# Patient Record
Sex: FEMALE | Race: WHITE | NOT HISPANIC OR LATINO | Employment: FULL TIME | URBAN - METROPOLITAN AREA
[De-identification: names, ages, dates, MRNs, and addresses within clinical notes are randomized per-mention and may not be internally consistent; named-entity substitution may affect disease eponyms.]

---

## 2018-05-31 ENCOUNTER — OFFICE VISIT (OUTPATIENT)
Dept: URGENT CARE | Facility: CLINIC | Age: 28
End: 2018-05-31
Payer: COMMERCIAL

## 2018-05-31 VITALS
HEIGHT: 64 IN | SYSTOLIC BLOOD PRESSURE: 103 MMHG | BODY MASS INDEX: 19.46 KG/M2 | HEART RATE: 75 BPM | DIASTOLIC BLOOD PRESSURE: 64 MMHG | OXYGEN SATURATION: 99 % | TEMPERATURE: 98 F | WEIGHT: 114 LBS | RESPIRATION RATE: 16 BRPM

## 2018-05-31 DIAGNOSIS — G43.001 MIGRAINE WITHOUT AURA AND WITH STATUS MIGRAINOSUS, NOT INTRACTABLE: Primary | ICD-10-CM

## 2018-05-31 PROCEDURE — 96372 THER/PROPH/DIAG INJ SC/IM: CPT | Mod: S$GLB,,, | Performed by: NURSE PRACTITIONER

## 2018-05-31 PROCEDURE — 99213 OFFICE O/P EST LOW 20 MIN: CPT | Mod: 25,S$GLB,, | Performed by: NURSE PRACTITIONER

## 2018-05-31 RX ORDER — PROCHLORPERAZINE MALEATE 10 MG
10 TABLET ORAL 3 TIMES DAILY
COMMUNITY
End: 2019-04-04

## 2018-05-31 RX ORDER — ELETRIPTAN HYDROBROMIDE 40 MG/1
40 TABLET, FILM COATED ORAL
COMMUNITY
End: 2019-04-04

## 2018-05-31 RX ORDER — ZONISAMIDE 50 MG/1
CAPSULE ORAL
COMMUNITY
End: 2019-04-04

## 2018-05-31 RX ORDER — DESVENLAFAXINE 100 MG/1
100 TABLET, EXTENDED RELEASE ORAL DAILY
COMMUNITY
End: 2019-05-28

## 2018-05-31 RX ORDER — TIZANIDINE 4 MG/1
4 TABLET ORAL EVERY 6 HOURS PRN
COMMUNITY
End: 2020-03-18 | Stop reason: SDUPTHER

## 2018-05-31 RX ORDER — DICLOFENAC POTASSIUM 50 MG/1
50 TABLET, FILM COATED ORAL 2 TIMES DAILY
COMMUNITY
End: 2020-02-17

## 2018-05-31 RX ORDER — ACETAMINOPHEN AND CODEINE PHOSPHATE 120; 12 MG/5ML; MG/5ML
1 SOLUTION ORAL DAILY
COMMUNITY
End: 2019-05-28

## 2018-05-31 RX ORDER — PROPRANOLOL HYDROCHLORIDE 10 MG/1
10 TABLET ORAL 3 TIMES DAILY
COMMUNITY
End: 2019-03-11 | Stop reason: SDUPTHER

## 2018-05-31 RX ORDER — BUTALBITAL, ACETAMINOPHEN AND CAFFEINE 50; 325; 40 MG/1; MG/1; MG/1
1 TABLET ORAL EVERY 4 HOURS PRN
COMMUNITY
End: 2020-02-17

## 2018-05-31 RX ORDER — DOXEPIN HYDROCHLORIDE 10 MG/ML
SOLUTION ORAL NIGHTLY
COMMUNITY
End: 2019-03-11 | Stop reason: SDUPTHER

## 2018-05-31 RX ORDER — KETOROLAC TROMETHAMINE 30 MG/ML
30 INJECTION, SOLUTION INTRAMUSCULAR; INTRAVENOUS
Status: COMPLETED | OUTPATIENT
Start: 2018-05-31 | End: 2018-05-31

## 2018-05-31 RX ADMIN — KETOROLAC TROMETHAMINE 30 MG: 30 INJECTION, SOLUTION INTRAMUSCULAR; INTRAVENOUS at 08:05

## 2018-06-01 NOTE — PATIENT INSTRUCTIONS
"  Migraine Headache: Stages and Treatment    A migraine headache tends to progress in stages. Learning these stages can help you better understand what is happening. Then you can learn ways to reduce pain and relieve other symptoms. Methods for relieving your symptoms include self-care and medicines.  Migraine stages  Migraines tend to progress through 4 stages. Many people don't have all stages, and stages may differ with each headache:  · Prodrome. A few hours to a day or so before the headache, you may feel tired, (yawning many times), uneasy, or contreras. You may also feel bloated or crave certain foods.  · Aura. Up to an hour before the headache starts, some migraine sufferers experience aura--flashing lights, blind spots, other vision problems, confusion, difficulty speaking, or other neurologic symptoms.  · Headache. Moderate to severe pain affects one side of the head and then can spread to both sides, often along with nausea. You may be highly sensitive to light, sound, and odors. Vomiting or diarrhea may also happen. This stage lasts 4 to 72 hours.  · Postdrome. After your headache ends, you may feel tired, achy, and "washed out." This may last for a day or so.  Self-care during a migraine  Here is what you can do:  · Use a cold compress. Wrap a thin cloth around a cold pack, a cold can of soda, or a bag of frozen vegetables. Apply this to your temple or other pain site.  · Drink fluids. If nausea makes it hard to drink, try sucking on ice.  · Rest. If possible, lie down. Try not to bend over, as this may increase your pain. Sometimes laying in a dark quiet room can help the migraine from being aggravated.    · Try caffeine. Some people find that drinking fluids with caffeine, such as coffee or tea, helps to lessen migraine pain.  Using medicines  Work with your healthcare provider to find the right medicines for you. Medicines for migraine may relieve pain (analgesics), relieve nausea, or attack the " migraine's root causes (migraine-specific medicines).  Rebound headache  Taking analgesics each day, or even several times a week, may lead to more frequent and severe headaches. These are called rebound headaches. If you think you're having rebound headaches, tell your healthcare provider. He or she can help you safely decrease your medicine. Rebound caffeine withdrawal headaches can also happen.    Date Last Reviewed: 10/9/2015  © 5068-6133 [employer] [address] [place] [address], [place] [address]. All rights reserved. This information is not intended as a substitute for professional medical care. Always follow your healthcare professional's instructions.

## 2018-06-01 NOTE — PROGRESS NOTES
"Subjective:       Patient ID: Nazario Gill is a 28 y.o. female.    Vitals:  height is 5' 4" (1.626 m) and weight is 51.7 kg (114 lb). Her oral temperature is 98.3 °F (36.8 °C). Her blood pressure is 103/64 and her pulse is 75. Her respiration is 16 and oxygen saturation is 99%.     Chief Complaint: Migraine    Migraine    This is a chronic problem. Episode onset: 3 days ago. The problem occurs constantly. The problem has been unchanged. The pain is located in the temporal region. The pain quality is similar to prior headaches. The quality of the pain is described as squeezing. The pain is at a severity of 10/10. The pain is severe. Associated symptoms include nausea and photophobia. Pertinent negatives include no blurred vision, dizziness, fever, neck pain, numbness, seizures, tinnitus, vomiting or weakness. The symptoms are aggravated by bright light. She has tried oral narcotics and darkened room (fioricet, relpax, compazine,diclofenac, zanaflex.  Patient was also treated in ER last night. Symptoms improved but returned today) for the symptoms. Her past medical history is significant for migraine headaches.     Review of Systems   Constitution: Negative for chills, fever and weakness.   HENT: Negative for congestion and tinnitus.    Eyes: Positive for photophobia. Negative for blurred vision.   Skin: Negative for rash.   Musculoskeletal: Negative for neck pain.   Gastrointestinal: Positive for nausea. Negative for vomiting.   Neurological: Negative for disturbances in coordination, dizziness, headaches, numbness and seizures.   Psychiatric/Behavioral: Negative for altered mental status. The patient is not nervous/anxious.        Objective:      Physical Exam   Constitutional: She is oriented to person, place, and time. She appears well-developed and well-nourished. She is cooperative.  Non-toxic appearance. She does not appear ill. No distress.   HENT:   Head: Normocephalic and atraumatic.   Right Ear: Hearing, " tympanic membrane, external ear and ear canal normal.   Left Ear: Hearing, tympanic membrane, external ear and ear canal normal.   Nose: Nose normal. No mucosal edema, rhinorrhea or nasal deformity. No epistaxis. Right sinus exhibits no maxillary sinus tenderness and no frontal sinus tenderness. Left sinus exhibits no maxillary sinus tenderness and no frontal sinus tenderness.   Mouth/Throat: Uvula is midline, oropharynx is clear and moist and mucous membranes are normal. No trismus in the jaw. Normal dentition. No uvula swelling. No posterior oropharyngeal erythema.   No temporal TTP   Eyes: Conjunctivae, EOM and lids are normal. Pupils are equal, round, and reactive to light. No scleral icterus.   Sclera clear bilat   Neck: Trachea normal, normal range of motion, full passive range of motion without pain and phonation normal. Neck supple. No neck rigidity.   Cardiovascular: Normal rate, regular rhythm, normal heart sounds, intact distal pulses and normal pulses.    Pulmonary/Chest: Effort normal and breath sounds normal. No respiratory distress.   Abdominal: Soft. Normal appearance and bowel sounds are normal. She exhibits no distension. There is no tenderness.   Musculoskeletal: Normal range of motion. She exhibits no edema or deformity.   Neurological: She is alert and oriented to person, place, and time. No cranial nerve deficit. She exhibits normal muscle tone. Coordination normal.   Skin: Skin is warm, dry and intact. She is not diaphoretic. No pallor.   Psychiatric: She has a normal mood and affect. Her speech is normal and behavior is normal. Judgment and thought content normal. Cognition and memory are normal.   Nursing note and vitals reviewed.      Assessment:       1. Migraine without aura and with status migrainosus, not intractable        Plan:         Migraine without aura and with status migrainosus, not intractable  -     ketorolac injection 30 mg; Inject 1 mL (30 mg total) into the muscle one  time.

## 2018-06-03 ENCOUNTER — TELEPHONE (OUTPATIENT)
Dept: URGENT CARE | Facility: CLINIC | Age: 28
End: 2018-06-03

## 2019-03-11 ENCOUNTER — OFFICE VISIT (OUTPATIENT)
Dept: SURGERY | Facility: CLINIC | Age: 29
End: 2019-03-11
Payer: COMMERCIAL

## 2019-03-11 VITALS
WEIGHT: 121.5 LBS | DIASTOLIC BLOOD PRESSURE: 78 MMHG | HEART RATE: 74 BPM | SYSTOLIC BLOOD PRESSURE: 119 MMHG | HEIGHT: 62 IN | BODY MASS INDEX: 22.36 KG/M2

## 2019-03-11 DIAGNOSIS — K64.4 EXTERNAL HEMORRHOIDS: Primary | ICD-10-CM

## 2019-03-11 DIAGNOSIS — K64.8 HEMORRHOIDS, INTERNAL, WITH BLEEDING: ICD-10-CM

## 2019-03-11 PROCEDURE — 99999 PR PBB SHADOW E&M-EST. PATIENT-LVL III: CPT | Mod: PBBFAC,,, | Performed by: COLON & RECTAL SURGERY

## 2019-03-11 PROCEDURE — 3008F BODY MASS INDEX DOCD: CPT | Mod: CPTII,S$GLB,, | Performed by: COLON & RECTAL SURGERY

## 2019-03-11 PROCEDURE — 99203 PR OFFICE/OUTPT VISIT, NEW, LEVL III, 30-44 MIN: ICD-10-PCS | Mod: 25,S$GLB,, | Performed by: COLON & RECTAL SURGERY

## 2019-03-11 PROCEDURE — 99203 OFFICE O/P NEW LOW 30 MIN: CPT | Mod: 25,S$GLB,, | Performed by: COLON & RECTAL SURGERY

## 2019-03-11 PROCEDURE — 3008F PR BODY MASS INDEX (BMI) DOCUMENTED: ICD-10-PCS | Mod: CPTII,S$GLB,, | Performed by: COLON & RECTAL SURGERY

## 2019-03-11 PROCEDURE — 46600 PR DIAG2STIC A2SCOPY: ICD-10-PCS | Mod: S$GLB,,, | Performed by: COLON & RECTAL SURGERY

## 2019-03-11 PROCEDURE — 46600 DIAGNOSTIC ANOSCOPY SPX: CPT | Mod: S$GLB,,, | Performed by: COLON & RECTAL SURGERY

## 2019-03-11 PROCEDURE — 99999 PR PBB SHADOW E&M-EST. PATIENT-LVL III: ICD-10-PCS | Mod: PBBFAC,,, | Performed by: COLON & RECTAL SURGERY

## 2019-03-11 RX ORDER — DOXEPIN HYDROCHLORIDE 10 MG/1
CAPSULE ORAL
Refills: 2 | COMMUNITY
Start: 2019-02-02 | End: 2019-09-17 | Stop reason: SDUPTHER

## 2019-03-11 RX ORDER — ELETRIPTAN HYDROBROMIDE 40 MG/1
TABLET, FILM COATED ORAL
COMMUNITY
End: 2019-03-11 | Stop reason: SDUPTHER

## 2019-03-11 RX ORDER — BUSPIRONE HYDROCHLORIDE 5 MG/1
5 TABLET ORAL 2 TIMES DAILY
Refills: 2 | COMMUNITY
Start: 2019-02-26 | End: 2019-04-04

## 2019-03-11 RX ORDER — DOXEPIN HYDROCHLORIDE 10 MG/1
CAPSULE ORAL
COMMUNITY
End: 2019-03-11 | Stop reason: SDUPTHER

## 2019-03-11 RX ORDER — AMOXICILLIN AND CLAVULANATE POTASSIUM 875; 125 MG/1; MG/1
TABLET, FILM COATED ORAL
COMMUNITY
End: 2019-04-04

## 2019-03-11 RX ORDER — BUTALBITAL, ACETAMINOPHEN AND CAFFEINE 300; 40; 50 MG/1; MG/1; MG/1
CAPSULE ORAL
COMMUNITY
End: 2019-04-04 | Stop reason: SDUPTHER

## 2019-03-11 RX ORDER — PROPRANOLOL HYDROCHLORIDE 10 MG/1
TABLET ORAL
COMMUNITY
End: 2019-05-28 | Stop reason: SDUPTHER

## 2019-03-11 RX ORDER — BUPROPION HYDROCHLORIDE 75 MG/1
TABLET ORAL
Refills: 1 | COMMUNITY
Start: 2019-02-14 | End: 2019-07-24 | Stop reason: SDUPTHER

## 2019-03-11 RX ORDER — TIZANIDINE HYDROCHLORIDE 4 MG/1
CAPSULE, GELATIN COATED ORAL
COMMUNITY
End: 2019-03-11 | Stop reason: SDUPTHER

## 2019-03-11 RX ORDER — DICLOFENAC POTASSIUM 50 MG/1
TABLET, FILM COATED ORAL
COMMUNITY
End: 2019-03-11 | Stop reason: SDUPTHER

## 2019-03-11 RX ORDER — ACETAMINOPHEN AND CODEINE PHOSPHATE 120; 12 MG/5ML; MG/5ML
SOLUTION ORAL
COMMUNITY
End: 2019-05-28

## 2019-03-11 RX ORDER — AZITHROMYCIN 500 MG/1
TABLET, FILM COATED ORAL
COMMUNITY
End: 2019-05-28 | Stop reason: ALTCHOICE

## 2019-03-11 RX ORDER — PROCHLORPERAZINE MALEATE 10 MG
TABLET ORAL
COMMUNITY
End: 2019-03-11 | Stop reason: SDUPTHER

## 2019-03-11 NOTE — LETTER
March 15, 2019      Giovani Ramos MD  [address]  [address]  [place] [address]           [facility]  [address]  [facility] [address]  Phone: [phone]          Patient: Nazario Gill   MR Number: [id number]   YOB: 1990   Date of Visit: 3/11/2019       Dear Dr. Giovani Ramos:    Thank you for referring Nazario Gill to me for evaluation. Attached you will find relevant portions of my assessment and plan of care.    If you have questions, please do not hesitate to call me. I look forward to following Nazario Gill along with you.    Sincerely,    Margarita Renae  CC:  No Recipients    If you would like to receive this communication electronically, please contact [email or web address] or [phone] to request more information on [employer] access.    For providers and/or their staff who would like to refer a patient to [facility], please contact us through our one-stop-shop provider referral line, [facility], at [phone].    If you feel you have received this communication in error or would no longer like to receive these types of communications, please e-mail [email or web address]

## 2019-03-18 ENCOUNTER — ANESTHESIA EVENT (OUTPATIENT)
Dept: SURGERY | Facility: HOSPITAL | Age: 29
End: 2019-03-18
Payer: COMMERCIAL

## 2019-03-19 NOTE — H&P (VIEW-ONLY)
"Subjective:       Patient ID: Nazario Gill is a 28 y.o. female.    Chief Complaint: Hemorrhoids    HPI   27 yo F with complaints of "hemorrhoids." She c/o itching, irritation, occasional pain and bleeding.  She reports that she has an external hemorrhoid that periodically gets larger and more symptomatic. No constipation/straining.  No abdominal pain, unexplained weight loss, anorexia, recent change in bowel habits, or other constitutional symptoms.     Last colonoscopy - never  No family hx of CRC or IBD.      Review of patient's allergies indicates:   Allergen Reactions    Cefzil [cefprozil] Hives    Sulfamethoxazole-trimethoprim        Past Medical History:   Diagnosis Date    Migraine        Past Surgical History:   Procedure Laterality Date    CYSTOTOMY         Current Outpatient Medications   Medication Sig Dispense Refill    amoxicillin-clavulanate 875-125mg (AUGMENTIN) 875-125 mg per tablet amoxicillin 875 mg-potassium clavulanate 125 mg tablet      azithromycin (ZITHROMAX) 500 MG tablet azithromycin 500 mg tablet      buPROPion (WELLBUTRIN) 75 MG tablet TAKE 1 TABLET BY MOUTH EVERY DAY FOR 2 WEEKS THEN TWICE A DAY THERE AFTER  1    busPIRone (BUSPAR) 5 MG Tab Take 5 mg by mouth 2 (two) times daily.  2    butalbital-acetaminophen-caff -40 mg Cap butalbital-acetaminophen-caffeine 50 mg-300 mg-40 mg capsule      butalbital-acetaminophen-caffeine -40 mg (FIORICET, ESGIC) -40 mg per tablet Take 1 tablet by mouth every 4 (four) hours as needed for Pain.      desvenlafaxine succinate (PRISTIQ) 100 MG Tb24 Take 100 mg by mouth once daily.      diclofenac (CATAFLAM) 50 MG tablet Take 50 mg by mouth 2 (two) times daily.      doxepin (SINEQUAN) 10 MG capsule TAKE 1 TO 3 CAPS AT BEDTIME AS NEEDED FOR SLEEP  2    norethindrone (YA) 0.35 mg tablet Ya 0.35 mg tablet      norethindrone (MICRONOR) 0.35 mg tablet Take 1 tablet by mouth once daily.      prochlorperazine (COMPAZINE) " 10 MG tablet Take 10 mg by mouth 3 (three) times daily.      propranolol (INDERAL) 10 MG tablet propranolol 10 mg tablet      tiZANidine (ZANAFLEX) 4 MG tablet Take 4 mg by mouth every 6 (six) hours as needed.      zonisamide (ZONEGRAN) 50 MG Cap Take by mouth.      eletriptan (RELPAX) 40 MG tablet Take 40 mg by mouth as needed. may repeat in 2 hours if necessary       No current facility-administered medications for this visit.        History reviewed. No pertinent family history.    Social History     Socioeconomic History    Marital status: Single     Spouse name: None    Number of children: None    Years of education: None    Highest education level: None   Social Needs    Financial resource strain: None    Food insecurity - worry: None    Food insecurity - inability: None    Transportation needs - medical: None    Transportation needs - non-medical: None   Occupational History    None   Tobacco Use    Smoking status: Never Smoker    Smokeless tobacco: Never Used   Substance and Sexual Activity    Alcohol use: None    Drug use: None    Sexual activity: None   Other Topics Concern    None   Social History Narrative    None       Review of Systems   Constitutional: Negative for chills and fever.   HENT: Negative for congestion and sore throat.    Eyes: Negative for visual disturbance.   Respiratory: Negative for cough and shortness of breath.    Cardiovascular: Negative for chest pain and palpitations.   Gastrointestinal: Positive for anal bleeding and rectal pain. Negative for abdominal distention, abdominal pain, blood in stool, constipation, diarrhea, nausea and vomiting.   Endocrine: Negative for cold intolerance and heat intolerance.   Genitourinary: Negative for dysuria and frequency.   Musculoskeletal: Negative for arthralgias, back pain and neck pain.   Skin: Negative for rash.   Allergic/Immunologic: Negative for immunocompromised state.   Neurological: Negative for dizziness,  light-headedness and headaches.   Hematological: Does not bruise/bleed easily.   Psychiatric/Behavioral: Negative for confusion. The patient is not nervous/anxious.        Objective:      Physical Exam   Constitutional: She is oriented to person, place, and time. She appears well-developed and well-nourished.   HENT:   Head: Normocephalic.   Pulmonary/Chest: Effort normal. No respiratory distress.   Abdominal: Soft. Bowel sounds are normal. She exhibits no distension and no mass. There is no tenderness. There is no rebound and no guarding.   Genitourinary:   Genitourinary Comments: Perineum - normal perianal skin, no mass, no fissure, + large external hemorrhoid RAL  MARISEL - good tone, no mass  Anoscopy - Grade 2 internal hemorrhoid RAL associated with external hemorrhoid     Musculoskeletal: Normal range of motion.   Neurological: She is alert and oriented to person, place, and time.   Skin: Skin is warm and dry.   Psychiatric: She has a normal mood and affect.         Lab Results   Component Value Date    WBC 10.23 03/11/2019    HGB 14.7 03/11/2019    HCT 44.4 03/11/2019    MCV 94 03/11/2019     03/11/2019     BMP  No results found for: NA, K, CL, CO2, BUN, CREATININE, CALCIUM, ANIONGAP, ESTGFRAFRICA, EGFRNONAA  CMP  No results found for: NA, K, CL, CO2, GLU, BUN, CREATININE, CALCIUM, PROT, ALBUMIN, BILITOT, ALKPHOS, AST, ALT, ANIONGAP, ESTGFRAFRICA, EGFRNONAA  No results found for: CEA        Assessment:       1. External hemorrhoids    2. Hemorrhoids, internal, with bleeding        Plan:   Discussed management options.  She would prefer to have the external hemorrhoid removed, so she will be scheduled for excisional hemorrhoidectomy - likely RAL single-column.   We discussed the expected degree & duration of post-op discomfort, and she still wishes to proceed with surgery.  Scheduled for 3/21/19 @ [facility].    I have discussed the procedure at length with Nazario Gill.  We discussed the rationale,  risks, benefits, and alternatives in depth.  We discussed the expected outcomes and potential complications including but not limited to bleeding, infection, recurrence, prolonged pain, need for further procedures and altered continence.  She verbalized her understanding of the procedure and wishes to proceed.  Written consent was obtained.    Silver Crouch MD, FACS, FASCRS  Senior Staff Surgeon  Department of Colon & Rectal Surgery

## 2019-03-20 ENCOUNTER — TELEPHONE (OUTPATIENT)
Dept: SURGERY | Facility: CLINIC | Age: 29
End: 2019-03-20

## 2019-03-20 NOTE — TELEPHONE ENCOUNTER
----- Message from Jes Bojorquez sent at 3/20/2019 11:42 AM CDT -----  Contact: pt#[phone]  Needs Advice    Reason for call:pt is calling for prep for surgery on 3/21        Communication Preference:call    Additional Information:

## 2019-03-21 ENCOUNTER — HOSPITAL ENCOUNTER (OUTPATIENT)
Facility: HOSPITAL | Age: 29
Discharge: HOME | End: 2019-03-21
Attending: COLON & RECTAL SURGERY | Admitting: COLON & RECTAL SURGERY
Payer: COMMERCIAL

## 2019-03-21 ENCOUNTER — ANESTHESIA (OUTPATIENT)
Dept: SURGERY | Facility: HOSPITAL | Age: 29
End: 2019-03-21
Payer: COMMERCIAL

## 2019-03-21 VITALS
HEART RATE: 82 BPM | TEMPERATURE: 97 F | DIASTOLIC BLOOD PRESSURE: 79 MMHG | BODY MASS INDEX: 22.36 KG/M2 | SYSTOLIC BLOOD PRESSURE: 141 MMHG | RESPIRATION RATE: 16 BRPM | WEIGHT: 121.5 LBS | OXYGEN SATURATION: 100 % | HEIGHT: 62 IN

## 2019-03-21 DIAGNOSIS — K64.9 HEMORRHOIDS: ICD-10-CM

## 2019-03-21 LAB — B-HCG UR QL: NEGATIVE

## 2019-03-21 PROCEDURE — 46255 PR HEMORRHOIDECTOMY,INT/EXT,1 COLUMN/GROUP: ICD-10-PCS | Mod: ,,, | Performed by: COLON & RECTAL SURGERY

## 2019-03-21 PROCEDURE — 88304 TISSUE SPECIMEN TO PATHOLOGY - SURGERY: ICD-10-PCS | Mod: 26,,, | Performed by: PATHOLOGY

## 2019-03-21 PROCEDURE — 37000009 HC ANESTHESIA EA ADD 15 MINS: Performed by: COLON & RECTAL SURGERY

## 2019-03-21 PROCEDURE — 71000033 HC RECOVERY, INTIAL HOUR: Performed by: COLON & RECTAL SURGERY

## 2019-03-21 PROCEDURE — 88304 TISSUE EXAM BY PATHOLOGIST: CPT | Mod: 26,,, | Performed by: PATHOLOGY

## 2019-03-21 PROCEDURE — 63600175 PHARM REV CODE 636 W HCPCS: Performed by: NURSE ANESTHETIST, CERTIFIED REGISTERED

## 2019-03-21 PROCEDURE — 88304 TISSUE EXAM BY PATHOLOGIST: CPT | Performed by: PATHOLOGY

## 2019-03-21 PROCEDURE — 25000003 PHARM REV CODE 250: Performed by: NURSE PRACTITIONER

## 2019-03-21 PROCEDURE — 36000707: Performed by: COLON & RECTAL SURGERY

## 2019-03-21 PROCEDURE — 36000706: Performed by: COLON & RECTAL SURGERY

## 2019-03-21 PROCEDURE — 46255 REMOVE INT/EXT HEM 1 GROUP: CPT | Mod: ,,, | Performed by: COLON & RECTAL SURGERY

## 2019-03-21 PROCEDURE — 00902 ANES ANORECTAL PX: CPT | Mod: QZ,P1 | Performed by: NURSE ANESTHETIST, CERTIFIED REGISTERED

## 2019-03-21 PROCEDURE — 37000008 HC ANESTHESIA 1ST 15 MINUTES: Performed by: COLON & RECTAL SURGERY

## 2019-03-21 PROCEDURE — 25000003 PHARM REV CODE 250: Performed by: NURSE ANESTHETIST, CERTIFIED REGISTERED

## 2019-03-21 PROCEDURE — 25000003 PHARM REV CODE 250: Performed by: COLON & RECTAL SURGERY

## 2019-03-21 PROCEDURE — 81025 URINE PREGNANCY TEST: CPT

## 2019-03-21 RX ORDER — LIDOCAINE HYDROCHLORIDE 10 MG/ML
INJECTION INFILTRATION; PERINEURAL
Status: DISCONTINUED | OUTPATIENT
Start: 2019-03-21 | End: 2019-03-21 | Stop reason: HOSPADM

## 2019-03-21 RX ORDER — DIPHENHYDRAMINE HCL 25 MG
25 CAPSULE ORAL EVERY 6 HOURS PRN
Status: DISCONTINUED | OUTPATIENT
Start: 2019-03-21 | End: 2019-03-21 | Stop reason: HOSPADM

## 2019-03-21 RX ORDER — SODIUM CHLORIDE, SODIUM LACTATE, POTASSIUM CHLORIDE, CALCIUM CHLORIDE 600; 310; 30; 20 MG/100ML; MG/100ML; MG/100ML; MG/100ML
INJECTION, SOLUTION INTRAVENOUS CONTINUOUS PRN
Status: DISCONTINUED | OUTPATIENT
Start: 2019-03-21 | End: 2019-03-21

## 2019-03-21 RX ORDER — MUPIROCIN 20 MG/G
OINTMENT TOPICAL
Status: ACTIVE | OUTPATIENT
Start: 2019-03-21

## 2019-03-21 RX ORDER — PROPOFOL 10 MG/ML
INJECTION, EMULSION INTRAVENOUS
Status: DISCONTINUED | OUTPATIENT
Start: 2019-03-21 | End: 2019-03-21

## 2019-03-21 RX ORDER — HYDROMORPHONE HYDROCHLORIDE 1 MG/ML
0.5 INJECTION, SOLUTION INTRAMUSCULAR; INTRAVENOUS; SUBCUTANEOUS
Status: DISCONTINUED | OUTPATIENT
Start: 2019-03-21 | End: 2019-03-21 | Stop reason: HOSPADM

## 2019-03-21 RX ORDER — MIDAZOLAM HYDROCHLORIDE 1 MG/ML
INJECTION, SOLUTION INTRAMUSCULAR; INTRAVENOUS
Status: DISCONTINUED | OUTPATIENT
Start: 2019-03-21 | End: 2019-03-21

## 2019-03-21 RX ORDER — LIDOCAINE HYDROCHLORIDE 10 MG/ML
1 INJECTION, SOLUTION EPIDURAL; INFILTRATION; INTRACAUDAL; PERINEURAL ONCE
Status: ACTIVE | OUTPATIENT
Start: 2019-03-21

## 2019-03-21 RX ORDER — FENTANYL CITRATE 50 UG/ML
INJECTION, SOLUTION INTRAMUSCULAR; INTRAVENOUS
Status: DISCONTINUED | OUTPATIENT
Start: 2019-03-21 | End: 2019-03-21

## 2019-03-21 RX ORDER — LIDOCAINE HCL/PF 100 MG/5ML
SYRINGE (ML) INTRAVENOUS
Status: DISCONTINUED | OUTPATIENT
Start: 2019-03-21 | End: 2019-03-21

## 2019-03-21 RX ORDER — SODIUM CHLORIDE 9 MG/ML
INJECTION, SOLUTION INTRAVENOUS CONTINUOUS
Status: ACTIVE | OUTPATIENT
Start: 2019-03-21

## 2019-03-21 RX ORDER — BUPIVACAINE HYDROCHLORIDE 2.5 MG/ML
INJECTION, SOLUTION EPIDURAL; INFILTRATION; INTRACAUDAL
Status: DISCONTINUED | OUTPATIENT
Start: 2019-03-21 | End: 2019-03-21 | Stop reason: HOSPADM

## 2019-03-21 RX ORDER — OXYCODONE AND ACETAMINOPHEN 5; 325 MG/1; MG/1
1 TABLET ORAL EVERY 6 HOURS PRN
Qty: 40 TABLET | Refills: 0 | Status: SHIPPED | OUTPATIENT
Start: 2019-03-21 | End: 2019-03-31

## 2019-03-21 RX ORDER — OXYCODONE AND ACETAMINOPHEN 5; 325 MG/1; MG/1
2 TABLET ORAL EVERY 4 HOURS PRN
Status: DISCONTINUED | OUTPATIENT
Start: 2019-03-21 | End: 2019-03-21 | Stop reason: HOSPADM

## 2019-03-21 RX ADMIN — MIDAZOLAM 2 MG: 1 INJECTION INTRAMUSCULAR; INTRAVENOUS at 11:03

## 2019-03-21 RX ADMIN — DIPHENHYDRAMINE HYDROCHLORIDE 25 MG: 25 CAPSULE ORAL at 02:03

## 2019-03-21 RX ADMIN — PROPOFOL 20 MG: 10 INJECTION, EMULSION INTRAVENOUS at 11:03

## 2019-03-21 RX ADMIN — PROPOFOL 10 MG: 10 INJECTION, EMULSION INTRAVENOUS at 11:03

## 2019-03-21 RX ADMIN — PROPOFOL 100 MG: 10 INJECTION, EMULSION INTRAVENOUS at 11:03

## 2019-03-21 RX ADMIN — FENTANYL CITRATE 50 MCG: 50 INJECTION, SOLUTION INTRAMUSCULAR; INTRAVENOUS at 11:03

## 2019-03-21 RX ADMIN — PROPOFOL 30 MG: 10 INJECTION, EMULSION INTRAVENOUS at 11:03

## 2019-03-21 RX ADMIN — SODIUM CHLORIDE: 0.9 INJECTION, SOLUTION INTRAVENOUS at 10:03

## 2019-03-21 RX ADMIN — OXYCODONE AND ACETAMINOPHEN 2 TABLET: 5; 325 TABLET ORAL at 01:03

## 2019-03-21 RX ADMIN — SODIUM CHLORIDE: 0.9 INJECTION, SOLUTION INTRAVENOUS at 11:03

## 2019-03-21 RX ADMIN — SODIUM CHLORIDE, SODIUM LACTATE, POTASSIUM CHLORIDE, AND CALCIUM CHLORIDE: .6; .31; .03; .02 INJECTION, SOLUTION INTRAVENOUS at 11:03

## 2019-03-21 RX ADMIN — LIDOCAINE HYDROCHLORIDE 40 MG: 20 INJECTION, SOLUTION INTRAVENOUS at 11:03

## 2019-03-21 NOTE — NURSING
Discussed discharge instructions, including medications, follow up, post-op care.  Patient verbalized understanding.  Patient escorted to vehicle via wheelchair; mother with patient.  Itching and redness have disappeared with benadryl.

## 2019-03-21 NOTE — NURSING
Patient complaining of generalized itching and redness related to oral pain medications.  Denies any shortness of breath, difficulty breathing, throat swelling.  Called Dr. Crouch to update with findings; spoke with his nurse, Elsi Muro.  Order given for Diphenhydramine, 25mg, orally to be taken every 6 hours.

## 2019-03-21 NOTE — DISCHARGE INSTRUCTIONS
Discharge Instructions for Hemorrhoid Surgery  You had surgery to remove hemorrhoids. These are large, swollen veins inside and outside the anus. Hemorrhoids are caused by too much pressure on the anus. This is often due to straining during bowel movements or pressure during pregnancy. After surgery, it may take a few weeks or longer to recover. This sheet tells you how to care for yourself once youre home.   Home care  You may have some bleeding, discharge, or itching for a short time after surgery. This is common. Once at home, be sure to:  · Take prescribed pain medicines on time as directed. Dont skip doses or wait until pain gets bad, as it may be harder to control.  · Take sitz baths. Fill a tub with 3 inches of warm water. Sit in the basin or tub for 10 to 20 minutes a few times a day and after each bowel movement.  · Avoid straining to pass stool. This can increase pressure on the anus. It can also lead to swelling.  · Avoid constipation:  ¨ Use a laxative or stool softener as advised.  ¨ Eat more high-fiber foods. These include whole grains, fruit, and veggies.  ¨ Drink plenty of fluids.  · Avoid heavy lifting and strenuous activity for 1 to 2 weeks.  · Use suppositories and pads, if needed. These can help relieve symptoms.  · Avoid driving until youre able to sit and move without pain. Ask someone to drive you to appointments, if needed.  · Practice good bowel habits. Dont ignore the urge to go. But avoid spending too much time on the toilet.  Follow-up  Youll have a follow-up visit with the healthcare provider. During this visit, the healthcare provider will check how well youre healing. This visit will likely happen within 1 to 2 weeks.  When to call your healthcare provider  Call your healthcare provider right away if you have any of the following:  · Fever of 100.4°F (38.0°C) or higher, or as directed by your healthcare provider  · A large amount of drainage or bleeding from the  rectum  · Trouble urinating  · No bowel movement for more than 48 hours   Date Last Reviewed: 7/1/2016  © 4377-5136 [employer] [address] [place] [address], [place] [address]. All rights reserved. This information is not intended as a substitute for professional medical care. Always follow your healthcare professional's instructions.

## 2019-03-21 NOTE — ANESTHESIA PREPROCEDURE EVALUATION
03/21/2019  Nazario Gill is a 28 y.o., female.    Anesthesia Evaluation    I have reviewed the Patient Summary Reports.    I have reviewed the Nursing Notes.   I have reviewed the Medications.     Review of Systems  Anesthesia Hx:  No problems with previous Anesthesia    Social:  Non-Smoker, No Alcohol Use    Hematology/Oncology:  Hematology Normal   Oncology Normal     EENT/Dental:EENT/Dental Normal   Cardiovascular:  Cardiovascular Normal Exercise tolerance: good     Pulmonary:  Pulmonary Normal    Renal/:  Renal/ Normal     Hepatic/GI:  Hepatic/GI Normal    Musculoskeletal:  Musculoskeletal Normal    Neurological:   Headaches    Endocrine:  Endocrine Normal    Dermatological:  Skin Normal    Psych:  Psychiatric Normal           Physical Exam  General:  Well nourished    Airway/Jaw/Neck:  Airway Findings: Mouth Opening: Normal Tongue: Normal  General Airway Assessment: Adult  Mallampati: II  TM Distance: Normal, at least 6 cm  Jaw/Neck Findings:  Neck ROM: Normal ROM      Dental:  Dental Findings: In tact        Mental Status:  Mental Status Findings:  Cooperative         Anesthesia Plan  Type of Anesthesia, risks & benefits discussed:  Anesthesia Type:  general  Patient's Preference:   Intra-op Monitoring Plan: standard ASA monitors  Intra-op Monitoring Plan Comments:   Post Op Pain Control Plan: multimodal analgesia  Post Op Pain Control Plan Comments:   Induction:    Beta Blocker:  Patient is not currently on a Beta-Blocker (No further documentation required).       Informed Consent: Patient understands risks and agrees with Anesthesia plan.  Questions answered. Anesthesia consent signed with patient.  ASA Score: 1     Day of Surgery Review of History & Physical: I have interviewed and examined the patient. I have reviewed the patient's H&P dated: 3/21/19. There are no significant changes.   H&P update referred to the surgeon.         Ready For Surgery From Anesthesia Perspective.

## 2019-03-21 NOTE — ANESTHESIA POSTPROCEDURE EVALUATION
Anesthesia Post Evaluation    Patient: Nazario Gill    Procedure(s) Performed: Procedure(s) (LRB):  HEMORRHOIDECTOMY (N/A)    Final Anesthesia Type: general  Patient location during evaluation: PACU  Patient participation: Yes- Able to Participate  Level of consciousness: awake and alert, oriented and awake  Post-procedure vital signs: reviewed and stable  Pain management: adequate  Airway patency: patent  PONV status at discharge: No PONV  Anesthetic complications: no      Cardiovascular status: blood pressure returned to baseline  Respiratory status: unassisted, spontaneous ventilation and room air  Hydration status: euvolemic  Follow-up not needed.  Comments: NAAC        Visit Vitals  /75   Pulse 72   Temp 36.1 °C (97 °F) (Oral)   Resp 16   SpO2 100%   Breastfeeding? No       Pain/Jennifer Score: Jennifer Score: 10 (3/21/2019 12:40 PM)

## 2019-03-21 NOTE — BRIEF OP NOTE
[facility]  Brief Operative Note    SUMMARY     Surgery Date: 3/21/2019     Surgeon(s) and Role:     * Silver Crouch MD - Primary    Assisting Surgeon: None    Pre-op Diagnosis:  External hemorrhoids [K64.4]  Hemorrhoids, internal, with bleeding [K64.8]    Operative Care:  Post-op Diagnosis: Post-Op Diagnosis Codes:     * External hemorrhoids [K64.4]     * Hemorrhoids, internal, with bleeding [K64.8]    Procedure(s) (LRB):  HEMORRHOIDECTOMY (N/A)    Anesthesia: Local/MAC   Total volume administered 1000 cc     Technical Procedures Used: Excisional hemorrhoidectomy, single column (anterior midline)    Description of the findings of the procedure: Single enlarged hemorrhoidal column-  Anterior midline with both internal/external components    Wound Class (Contaminated    Complications: No     Estimated Blood Loss: 10 mL           Specimens:   Specimen (12h ago, onward)    Start     Ordered    03/21/19 1149  Specimen to Pathology - Surgery  Once     Comments:  Pre-op:external hemorrhoids, internal hemorrhoids with bleedingPost-op:sameProcedure:hemorrhoidectomySpecimen:anterior midline hemorrhoidPhysician: Dr. Crouch     Start Status   03/21/19 1149 Collected (03/21/19 1157)       03/21/19 1156          Implants: * No implants in log *    Post-Operative Care:         Disposition: PACU - hemodynamically stable.           Condition: Good  PT Temp >36 upon leaving the OR? Yes

## 2019-03-21 NOTE — NURSING
Patient arrived to floor post-op from hemmeroidectomy in room 313.  Bedside report given by YESENIA Fountain.  Mother at bedside.  Patient awake, alert, oriented.  Assisted patient to restroom to void.  Patient tolerated ambulation well.  Pain currently 6/10.

## 2019-03-21 NOTE — DISCHARGE SUMMARY
Discharge Note      SUMMARY     Admit Date: 3/21/2019    Attending Physician: Silver Crouch MD     Discharge Physician: Silver Crouch MD    Discharge Date: 3/21/2019     Final Diagnosis: Post-Op Diagnosis Codes:     * External hemorrhoids [K64.4]     * Hemorrhoids, internal, with bleeding [K64.8]    Hospital Course: Patient was admitted for an outpatient procedure and tolerated the procedure well with no complications.    Disposition: Home or Self Care    Follow Up/Patient Instructions:     High fiber diet/daily fiber supplement  8-10 glasses of water/day  Colace 100 mg 2x/day  Miralax 1 capful in glass of water 2x/day  Avoid straining/constipation  Gauze packing will pass with 1st BM  Dry dressing as needed  Some bleeding is expected - call for excessive bleeding  Call for severe pain, fever >101, difficulty urinating, constipation  Follow-up with Dr. Crouch in 3 weeks      Current Discharge Medication List      START taking these medications    Details   oxyCODONE-acetaminophen (PERCOCET) 5-325 mg per tablet Take 1 tablet by mouth every 6 (six) hours as needed for Pain (severe pain).  Qty: 40 tablet, Refills: 0         CONTINUE these medications which have NOT CHANGED    Details   buPROPion (WELLBUTRIN) 75 MG tablet TAKE 1 TABLET BY MOUTH EVERY DAY FOR 2 WEEKS THEN TWICE A DAY THERE AFTER  Refills: 1      busPIRone (BUSPAR) 5 MG Tab Take 5 mg by mouth 2 (two) times daily.  Refills: 2      butalbital-acetaminophen-caff -40 mg Cap butalbital-acetaminophen-caffeine 50 mg-300 mg-40 mg capsule      butalbital-acetaminophen-caffeine -40 mg (FIORICET, ESGIC) -40 mg per tablet Take 1 tablet by mouth every 4 (four) hours as needed for Pain.      desvenlafaxine succinate (PRISTIQ) 100 MG Tb24 Take 100 mg by mouth once daily.      diclofenac (CATAFLAM) 50 MG tablet Take 50 mg by mouth 2 (two) times daily.      doxepin (SINEQUAN) 10 MG capsule TAKE 1 TO 3 CAPS AT BEDTIME AS NEEDED FOR SLEEP  Refills: 2       eletriptan (RELPAX) 40 MG tablet Take 40 mg by mouth as needed. may repeat in 2 hours if necessary      !! norethindrone (YA) 0.35 mg tablet Ya 0.35 mg tablet      !! norethindrone (MICRONOR) 0.35 mg tablet Take 1 tablet by mouth once daily.      prochlorperazine (COMPAZINE) 10 MG tablet Take 10 mg by mouth 3 (three) times daily.      propranolol (INDERAL) 10 MG tablet propranolol 10 mg tablet      tiZANidine (ZANAFLEX) 4 MG tablet Take 4 mg by mouth every 6 (six) hours as needed.      zonisamide (ZONEGRAN) 50 MG Cap Take by mouth.      amoxicillin-clavulanate 875-125mg (AUGMENTIN) 875-125 mg per tablet amoxicillin 875 mg-potassium clavulanate 125 mg tablet      azithromycin (ZITHROMAX) 500 MG tablet azithromycin 500 mg tablet       !! - Potential duplicate medications found. Please discuss with provider.          Discharge Procedure Orders (must include Diet, Follow-up, Activity)   Discharge Procedure Orders (must include Diet, Follow-up, Activity)   Diet Adult Regular     Activity as tolerated

## 2019-03-21 NOTE — TRANSFER OF CARE
Anesthesia Transfer of Care Note    Patient: Nazario Gill    Procedure(s) Performed: Procedure(s) (LRB):  HEMORRHOIDECTOMY (N/A)    Patient location: PACU    Anesthesia Type: MAC    Transport from OR: Transported from OR on room air with adequate spontaneous ventilation    Post pain: adequate analgesia    Post assessment: no apparent anesthetic complications and tolerated procedure well    Post vital signs: stable    Level of consciousness: sedated    Nausea/Vomiting: no nausea/vomiting    Complications: none    Transfer of care protocol was followed      Last vitals:   Visit Vitals  /63 (BP Location: Left arm, Patient Position: Lying)   Pulse 95   Resp 16   SpO2 100%   Breastfeeding? No

## 2019-03-21 NOTE — INTERVAL H&P NOTE
The patient has been examined and the H&P has been reviewed:        I concur with the findings and no changes have occurred since H&P was written.        Patient cleared for Anesthesia: MAC        Anesthesia/Surgery risks, benefits and alternative options discussed and understood by patient/family.      Active Hospital Problems    Diagnosis  POA    Hemorrhoids [K64.9]  Yes      Resolved Hospital Problems   No resolved problems to display.

## 2019-03-21 NOTE — OP NOTE
DATE OF PROCEDURE:  03/21/2019    PREOPERATIVE DIAGNOSIS:  Symptomatic internal and external hemorrhoids.    POSTOPERATIVE DIAGNOSIS:  Symptomatic internal and external hemorrhoids.    PROCEDURE PERFORMED:  Excisional hemorrhoidectomy (single column, anterior   midline, internal/external).    SURGEON:  Silver Crouch M.D.    ASSISTANT:  None.    ANESTHESIA:  Local MAC.    IV FLUIDS:  1 L crystalloid.    ESTIMATED BLOOD LOSS:  Less than 5 mL.    DRAINS:  None.    SPECIMENS:  Anterior midline hemorrhoidal complex to Pathology.    COMPLICATIONS:  None.    OPERATIVE FINDINGS:  Enlarged anterior midline external hemorrhoid with an   associated internal component.  No other significant hemorrhoidal disease   present.    INDICATIONS FOR PROCEDURE:  The patient is a 28-year-old female who presented to   the office with complaints of a painful external hemorrhoid.  On anoscopy, she   had an internal component associated with this.  We discussed treatment options   and she elected to proceed with an excisional hemorrhoidectomy.    DESCRIPTION OF PROCEDURE:  The patient was identified, brought to the Operating   Room and placed on the table in a prone position after obtaining informed   consent and after ensuring adequate padding of all pressure points.  After   initiation of monitored anesthesia care, the table was jackknifed and the   buttocks taped apart to efface the anal verge.  The perianal region was prepped   and draped in usual sterile fashion.    A perianal block was then performed using a combination of 1% lidocaine and   0.25% Marcaine.  Visual inspection revealed an external hemorrhoid in the   anterior midline position.  We evaluated the anal canal with a lighted Velazquez   anal retractor.  There was an internal component associated with the external   component in the anterior midline; however, there was no other significant   hemorrhoidal disease present.    We began by making an elliptical incision around the  external component.  This   was then dissected off the underlying sphincter musculature using a combination   of sharp dissection and electrocautery.  Mobilization was carried proximally   into the anal canal, mobilizing the hemorrhoidal complex up to its apex, which   was transected and the hemorrhoidal tissue was passed from the field.  The   internal portion of the hemorrhoidectomy site was closed with interrupted 3-0   Vicryl figure-of-eight sutures to reapproximate the mucosa.  The skin at the   level of the anterior midline anal verge where the external component had been   excised was closed with interrupted 3-0 chromic simple sutures.  Additional   local anesthetic was infiltrated at the surgical site.  Anal canal was irrigated   and noted to be hemostatic.  Gelfoam gauze was placed within the anal canal and   sterile dressings were applied.    The patient tolerated the procedure well with no complications.  She was   awakened from sedation in the Operating Room and taken to Recovery in   satisfactory condition.  All needle, instrument and sponge counts were correct   at the end of the case.  I was present throughout the entire procedure.      JAIME/IN  dd: 03/21/2019 17:52:03 (CDT)  td: 03/21/2019 18:10:55 (CDJUAN RAMON)  Doc ID   #[id number]  Job ID #[id number]    CC:

## 2019-03-22 ENCOUNTER — TELEPHONE (OUTPATIENT)
Dept: SURGERY | Facility: CLINIC | Age: 29
End: 2019-03-22

## 2019-03-22 NOTE — TELEPHONE ENCOUNTER
Pt called concerned because she does not have any pain from the hemorrhoidectomy yesterday. Told her that he did give her an anal block at the end of the surgery which will help with the pain. She passed the packing  And that may be a part of why she doesn't have the pain she had when it was in.

## 2019-03-25 ENCOUNTER — TELEPHONE (OUTPATIENT)
Dept: SURGERY | Facility: CLINIC | Age: 29
End: 2019-03-25

## 2019-03-25 NOTE — TELEPHONE ENCOUNTER
----- Message from Jelani Baum sent at 3/25/2019  3:13 PM CDT -----  Contact: Pt  Pt would like to be called back asap regarding her hemorrhoidectomy on 3/21/19. Pt would like to discuss further.    Pt can be reached at [phone].    Thanks

## 2019-04-04 ENCOUNTER — OFFICE VISIT (OUTPATIENT)
Dept: SURGERY | Facility: CLINIC | Age: 29
End: 2019-04-04
Payer: COMMERCIAL

## 2019-04-04 VITALS
RESPIRATION RATE: 16 BRPM | DIASTOLIC BLOOD PRESSURE: 64 MMHG | SYSTOLIC BLOOD PRESSURE: 120 MMHG | HEIGHT: 62 IN | HEART RATE: 62 BPM | WEIGHT: 122.38 LBS | BODY MASS INDEX: 22.52 KG/M2

## 2019-04-04 DIAGNOSIS — K64.8 HEMORRHOIDS, INTERNAL, WITH BLEEDING: ICD-10-CM

## 2019-04-04 DIAGNOSIS — K64.4 EXTERNAL HEMORRHOIDS: Primary | ICD-10-CM

## 2019-04-04 PROCEDURE — 99999 PR PBB SHADOW E&M-EST. PATIENT-LVL III: ICD-10-PCS | Mod: PBBFAC,,, | Performed by: COLON & RECTAL SURGERY

## 2019-04-04 PROCEDURE — 99024 POSTOP FOLLOW-UP VISIT: CPT | Mod: S$GLB,,, | Performed by: COLON & RECTAL SURGERY

## 2019-04-04 PROCEDURE — 99024 PR POST-OP FOLLOW-UP VISIT: ICD-10-PCS | Mod: S$GLB,,, | Performed by: COLON & RECTAL SURGERY

## 2019-04-04 PROCEDURE — 99999 PR PBB SHADOW E&M-EST. PATIENT-LVL III: CPT | Mod: PBBFAC,,, | Performed by: COLON & RECTAL SURGERY

## 2019-04-04 NOTE — PROGRESS NOTES
CRS Post-operative visit    Visit Info:     Procedure: Excisional hemorrhoidectomy (single column, anterior midline, internal/external).    Date of Procedure: March 21, 2019    Indication:  A 28-year-old female with symptomatic hemorrhoids.    Current Status:  Doing well postop.  Having minimal pain.  She has occasional scant bleeding with bowel movements but overall this seems to be improving.  No problems incontinence. No significant pain with her bowel movements.    Pathology:   Anterior midline hemorrhoid:  Benign anal mucosa with underlying hemorrhoidal varices.    Physical Exam:  General: White female in NAD   Neuro: aaox4   Respiratory: resps even unlabored  Extremities: Warm dry and intact  Anorectal:  Anterior midline hemorrhoidectomy incision with superficial dehiscence and healing ridges on either side    Assessment:  Symptomatic hemorrhoids, s/p single column hemorrhoidectomy     Plan:  Diet & activity as tolerated.  Avoid straining/constipation.  RTO prn    Silver Crouch MD, FACS, FASCRS  Senior Staff Surgeon  Department of Colon & Rectal Surgery

## 2019-04-04 NOTE — LETTER
April 4, 2019      Giovani Ramos MD  [address]  [address]  [facility] [address]           [facility]  [address] [place] [id number]  Phone: [phone]  Fax: [phone]          Patient: Nazario Gill   MR Number: [id number]   YOB: 1990   Date of Visit: 4/4/2019       Dear Dr Ramos:    Thank you for referring Nazario Gill to me for evaluation. Attached you will find relevant portions of my assessment and plan of care.    If you have questions, please do not hesitate to call me. I look forward to following Nazario Gill along with you.    Sincerely,    Silver Crouch MD    Enclosure  CC:  Blake Hughes MD    If you would like to receive this communication electronically, please contact [email or web address] or [phone] to request more information on [email or web address] Link access.    For providers and/or their staff who would like to refer a patient to [facility], please contact us through our one-stop-shop provider referral line, [facility] [place], at [phone].    If you feel you have received this communication in error or would no longer like to receive these types of communications, please e-mail [email or web address]

## 2019-05-28 ENCOUNTER — OFFICE VISIT (OUTPATIENT)
Dept: NEUROLOGY | Facility: CLINIC | Age: 29
End: 2019-05-28
Payer: COMMERCIAL

## 2019-05-28 ENCOUNTER — TELEPHONE (OUTPATIENT)
Dept: PHARMACY | Facility: CLINIC | Age: 29
End: 2019-05-28

## 2019-05-28 VITALS
BODY MASS INDEX: 22.44 KG/M2 | HEART RATE: 72 BPM | DIASTOLIC BLOOD PRESSURE: 62 MMHG | SYSTOLIC BLOOD PRESSURE: 108 MMHG | RESPIRATION RATE: 16 BRPM | HEIGHT: 62 IN | WEIGHT: 121.94 LBS

## 2019-05-28 DIAGNOSIS — F41.9 ANXIETY: ICD-10-CM

## 2019-05-28 DIAGNOSIS — G47.00 INSOMNIA, UNSPECIFIED TYPE: ICD-10-CM

## 2019-05-28 PROCEDURE — 3008F BODY MASS INDEX DOCD: CPT | Mod: CPTII,S$GLB,, | Performed by: PSYCHIATRY & NEUROLOGY

## 2019-05-28 PROCEDURE — 99204 PR OFFICE/OUTPT VISIT, NEW, LEVL IV, 45-59 MIN: ICD-10-PCS | Mod: S$GLB,,, | Performed by: PSYCHIATRY & NEUROLOGY

## 2019-05-28 PROCEDURE — 99204 OFFICE O/P NEW MOD 45 MIN: CPT | Mod: S$GLB,,, | Performed by: PSYCHIATRY & NEUROLOGY

## 2019-05-28 PROCEDURE — 99999 PR PBB SHADOW E&M-EST. PATIENT-LVL III: CPT | Mod: PBBFAC,,, | Performed by: PSYCHIATRY & NEUROLOGY

## 2019-05-28 PROCEDURE — 3008F PR BODY MASS INDEX (BMI) DOCUMENTED: ICD-10-PCS | Mod: CPTII,S$GLB,, | Performed by: PSYCHIATRY & NEUROLOGY

## 2019-05-28 PROCEDURE — 99999 PR PBB SHADOW E&M-EST. PATIENT-LVL III: ICD-10-PCS | Mod: PBBFAC,,, | Performed by: PSYCHIATRY & NEUROLOGY

## 2019-05-28 RX ORDER — ERENUMAB-AOOE 140 MG/ML
140 INJECTION, SOLUTION SUBCUTANEOUS
Qty: 1 ML | Refills: 11 | Status: SHIPPED | OUTPATIENT
Start: 2019-05-28 | End: 2019-07-24

## 2019-05-28 RX ORDER — CETIRIZINE HYDROCHLORIDE 10 MG/1
10 TABLET ORAL DAILY
COMMUNITY
End: 2020-02-20

## 2019-05-28 RX ORDER — PROPRANOLOL HYDROCHLORIDE 10 MG/1
10 TABLET ORAL DAILY
Qty: 90 TABLET | Refills: 3 | Status: SHIPPED | OUTPATIENT
Start: 2019-05-28 | End: 2019-07-02

## 2019-05-28 RX ORDER — PROCHLORPERAZINE MALEATE 10 MG
1 TABLET ORAL DAILY PRN
COMMUNITY
Start: 2019-05-20 | End: 2020-05-20

## 2019-05-28 RX ORDER — ERENUMAB-AOOE 140 MG/ML
140 INJECTION, SOLUTION SUBCUTANEOUS
Refills: 0 | COMMUNITY
Start: 2019-05-22 | End: 2019-05-28 | Stop reason: SDUPTHER

## 2019-05-28 RX ORDER — DESVENLAFAXINE SUCCINATE 50 MG/1
100 TABLET, EXTENDED RELEASE ORAL DAILY
Qty: 60 TABLET | Refills: 11 | Status: SHIPPED | OUTPATIENT
Start: 2019-05-28 | End: 2019-07-01

## 2019-05-28 NOTE — PROGRESS NOTES
The patient is self referred    HPI: Nazario Gill is a 29 y.o. female with headache which started in her middle school years. These became more frequent in college. She increased to having some tingling int he left arm and face with visual changes.   For a headache, she has temple pain bilaterally, last hours and nausea/ light sensitivity.  She had MRI with Dr Sims prior and she was told this is normal  She had used exedrin OTC until seeing her first neurologist in 2015.  She was tried on Topamax (caused too much weight loss)  She saw an outside Neurologist, Dr Dillon until about a year ago more recently.  She was placed on propranolol which she is still on.  She has tried pamelor, fioricet, aimovig, tizanadin.doxepin, Diclofenac, zonegran  She ran out of propranolol because she was accidentally taking this twice daily this month. She has had some increased symptoms off of this.   She wants to transfer her as Aimovig works greatly and she has no further numbness and severe headaches.  She could not tolerate triptans.  PRn headache she uses diclofenac, Fioricet or toradol nose spray (starts in that order usually). Compazine is used for nausea  Currently she also takes propranolol , doxepin for headache prevention. She uses zanaflex nightly for sleep  She also takes Wellbutrin and Pristiq but she has reflux treated with ENT. She rarely uses toradol nasal spray  She had been on Pristiq for some time. She had anxiety.  She states this feels improved with wellbutrin.All of this is prescribed by Dr Dillon.   She was able to taper zonegram and ketolorac  She never tried Botox.      Works at [employer]    Review of Systems   Constitutional: Negative for fever.   HENT: Negative for nosebleeds.    Eyes: Negative for double vision.   Respiratory: Negative for hemoptysis.    Cardiovascular: Negative for leg swelling.   Gastrointestinal: Negative for blood in stool.   Genitourinary: Negative for hematuria.    Musculoskeletal: Negative for falls.   Skin: Positive for rash.        She had a welt where she last had aimovig injection which resolved within 24 hours   Neurological: Negative for seizures.   Psychiatric/Behavioral: Negative for memory loss.         I have reviewed all of this patient's past medical and surgical histories as well as family and social histories and active allergies and medications as documented in the electronic medical record.            Exam:  Gen Appearance, well developed/nourished in no apparent distress  CV: 2+ distal pulses with no edema or swelling  Neuro:  MS: Awake, alert, oriented to place, person, time, situation. Sustains attention. Recent/remote memory intact, Language is full to spontaneous speech/repetition/naming/comprehension. Fund of Knowledge is full  CN: Optic discs are flat with normal vasculature, PERRL, Extraoccular movements and visual fields are full. Normal facial sensation and strength, Hearing symmetric, Tongue and Palate are midline and strong. Shoulder Shrug symmetric and strong.  Motor: Normal bulk, tone, no abnormal movements. 5/5 strength bilateral upper/lower extremities with 2+ reflexes  Sensory: symmetric to light touch, pain, temp, and vibration Romberg negative  Cerebellar: Finger-nose,Heal-shin, Rapid alternating movements intact  Gait: Normal stance, no ataxia      Labs: 2019 CBC unremarkable      Assessment/Plan: Nazario Gill is a 29 y.o. female with a long history of chronic migraine with aura (some hemisensory symptoms)    I recommend:   1.Continue Aimovig which has given her the best relief for migraine prevention. Watch for worsening injection site reaction  -continue propranolol (start again now) as it does not seem she can fully tolerate off at this time  -Continue Doxepin and Zanaflex (also used for sleep). Can try using Zanaflex as needed instead as she is improved  -Prestiq has been used long term for anxiety.Adding Wellbutrin this  year, reduced her anxiety more. She can try to lower Pristiq dose to 50mg daily (plan to consider lowering again at the next visit) unless worse anxiety.   -Continue compazine for nausea  -She will continue diclofenac +/- fioricet +/- toradol spray PRN  -Prior imaging including MRI brain was normal  -Avoid triptans which she could not tolerate prior and given her neurosensory symptoms with prior headaches.     RTC  6months

## 2019-05-28 NOTE — TELEPHONE ENCOUNTER
LVM for callback to inform patient that [facility] received prescription for Aimovig and benefits investigation is required.  Refill too soon until 6/13/2019.  [facility] will be back in touch once insurance determination is received to determine if patient wants to continue with current pharmacy or transition to [facility].

## 2019-06-04 NOTE — PROGRESS NOTES
"Subjective:       Patient ID: Nazario Gill is a 28 y.o. female.    Chief Complaint: Hemorrhoids    HPI   27 yo F with complaints of "hemorrhoids." She c/o itching, irritation, occasional pain and bleeding.  She reports that she has an external hemorrhoid that periodically gets larger and more symptomatic. No constipation/straining.  No abdominal pain, unexplained weight loss, anorexia, recent change in bowel habits, or other constitutional symptoms.     Last colonoscopy - never  No family hx of CRC or IBD.      Review of patient's allergies indicates:   Allergen Reactions    Cefzil [cefprozil] Hives    Sulfamethoxazole-trimethoprim        Past Medical History:   Diagnosis Date    Migraine        Past Surgical History:   Procedure Laterality Date    CYSTOTOMY         Current Outpatient Medications   Medication Sig Dispense Refill    amoxicillin-clavulanate 875-125mg (AUGMENTIN) 875-125 mg per tablet amoxicillin 875 mg-potassium clavulanate 125 mg tablet      azithromycin (ZITHROMAX) 500 MG tablet azithromycin 500 mg tablet      buPROPion (WELLBUTRIN) 75 MG tablet TAKE 1 TABLET BY MOUTH EVERY DAY FOR 2 WEEKS THEN TWICE A DAY THERE AFTER  1    busPIRone (BUSPAR) 5 MG Tab Take 5 mg by mouth 2 (two) times daily.  2    butalbital-acetaminophen-caff -40 mg Cap butalbital-acetaminophen-caffeine 50 mg-300 mg-40 mg capsule      butalbital-acetaminophen-caffeine -40 mg (FIORICET, ESGIC) -40 mg per tablet Take 1 tablet by mouth every 4 (four) hours as needed for Pain.      desvenlafaxine succinate (PRISTIQ) 100 MG Tb24 Take 100 mg by mouth once daily.      diclofenac (CATAFLAM) 50 MG tablet Take 50 mg by mouth 2 (two) times daily.      doxepin (SINEQUAN) 10 MG capsule TAKE 1 TO 3 CAPS AT BEDTIME AS NEEDED FOR SLEEP  2    norethindrone (YA) 0.35 mg tablet Ya 0.35 mg tablet      norethindrone (MICRONOR) 0.35 mg tablet Take 1 tablet by mouth once daily.      prochlorperazine (COMPAZINE) " 10 MG tablet Take 10 mg by mouth 3 (three) times daily.      propranolol (INDERAL) 10 MG tablet propranolol 10 mg tablet      tiZANidine (ZANAFLEX) 4 MG tablet Take 4 mg by mouth every 6 (six) hours as needed.      zonisamide (ZONEGRAN) 50 MG Cap Take by mouth.      eletriptan (RELPAX) 40 MG tablet Take 40 mg by mouth as needed. may repeat in 2 hours if necessary       No current facility-administered medications for this visit.        History reviewed. No pertinent family history.    Social History     Socioeconomic History    Marital status: Single     Spouse name: None    Number of children: None    Years of education: None    Highest education level: None   Social Needs    Financial resource strain: None    Food insecurity - worry: None    Food insecurity - inability: None    Transportation needs - medical: None    Transportation needs - non-medical: None   Occupational History    None   Tobacco Use    Smoking status: Never Smoker    Smokeless tobacco: Never Used   Substance and Sexual Activity    Alcohol use: None    Drug use: None    Sexual activity: None   Other Topics Concern    None   Social History Narrative    None       Review of Systems   Constitutional: Negative for chills and fever.   HENT: Negative for congestion and sore throat.    Eyes: Negative for visual disturbance.   Respiratory: Negative for cough and shortness of breath.    Cardiovascular: Negative for chest pain and palpitations.   Gastrointestinal: Positive for anal bleeding and rectal pain. Negative for abdominal distention, abdominal pain, blood in stool, constipation, diarrhea, nausea and vomiting.   Endocrine: Negative for cold intolerance and heat intolerance.   Genitourinary: Negative for dysuria and frequency.   Musculoskeletal: Negative for arthralgias, back pain and neck pain.   Skin: Negative for rash.   Allergic/Immunologic: Negative for immunocompromised state.   Neurological: Negative for dizziness,  light-headedness and headaches.   Hematological: Does not bruise/bleed easily.   Psychiatric/Behavioral: Negative for confusion. The patient is not nervous/anxious.        Objective:      Physical Exam   Constitutional: She is oriented to person, place, and time. She appears well-developed and well-nourished.   HENT:   Head: Normocephalic.   Pulmonary/Chest: Effort normal. No respiratory distress.   Abdominal: Soft. Bowel sounds are normal. She exhibits no distension and no mass. There is no tenderness. There is no rebound and no guarding.   Genitourinary:   Genitourinary Comments: Perineum - normal perianal skin, no mass, no fissure, + large external hemorrhoid RAL  MARISEL - good tone, no mass  Anoscopy - Grade 2 internal hemorrhoid RAL associated with external hemorrhoid     Musculoskeletal: Normal range of motion.   Neurological: She is alert and oriented to person, place, and time.   Skin: Skin is warm and dry.   Psychiatric: She has a normal mood and affect.         Lab Results   Component Value Date    WBC 10.23 03/11/2019    HGB 14.7 03/11/2019    HCT 44.4 03/11/2019    MCV 94 03/11/2019     03/11/2019     BMP  No results found for: NA, K, CL, CO2, BUN, CREATININE, CALCIUM, ANIONGAP, ESTGFRAFRICA, EGFRNONAA  CMP  No results found for: NA, K, CL, CO2, GLU, BUN, CREATININE, CALCIUM, PROT, ALBUMIN, BILITOT, ALKPHOS, AST, ALT, ANIONGAP, ESTGFRAFRICA, EGFRNONAA  No results found for: CEA        Assessment:       1. External hemorrhoids    2. Hemorrhoids, internal, with bleeding        Plan:   Discussed management options.  She would prefer to have the external hemorrhoid removed, so she will be scheduled for excisional hemorrhoidectomy - likely RAL single-column.   We discussed the expected degree & duration of post-op discomfort, and she still wishes to proceed with surgery.  Scheduled for 3/21/19 @ [facility].    I have discussed the procedure at length with Nazario Gill.  We discussed the rationale,  risks, benefits, and alternatives in depth.  We discussed the expected outcomes and potential complications including but not limited to bleeding, infection, recurrence, prolonged pain, need for further procedures and altered continence.  She verbalized her understanding of the procedure and wishes to proceed.  Written consent was obtained.    Silver Crouch MD, FACS, FASCRS  Senior Staff Surgeon  Department of Colon & Rectal Surgery

## 2019-06-13 ENCOUNTER — TELEPHONE (OUTPATIENT)
Dept: SURGERY | Facility: CLINIC | Age: 29
End: 2019-06-13

## 2019-06-13 NOTE — TELEPHONE ENCOUNTER
----- Message from Estefani Garsia sent at 2019  3:40 PM CDT -----  Contact: PATIENT  Nazario Gill  MRN: [id number]  : 1990  PCP: Blake Hughes  Home Phone      [phone]  Work Phone      Not on file.  Mobile          [phone]      MESSAGE: Patient states that she is needing a letter stating that she had surgery in February.  She would like the emailed to her if at all possible.        Phone: [phone]

## 2019-06-18 ENCOUNTER — TELEPHONE (OUTPATIENT)
Dept: PHARMACY | Facility: CLINIC | Age: 29
End: 2019-06-18

## 2019-06-18 NOTE — TELEPHONE ENCOUNTER
Call attempt 1 for initial Aimovig consult and dispensing. LVM and sent [employer]. Copay $25 at 004.

## 2019-06-27 ENCOUNTER — TELEPHONE (OUTPATIENT)
Dept: PHARMACY | Facility: CLINIC | Age: 29
End: 2019-06-27

## 2019-06-27 NOTE — TELEPHONE ENCOUNTER
Call attempt #2 for initial Aimovig consult and dispensing. LVM  MyChart sent 6/18/19 has not been read. Copay $25 at 004.

## 2019-07-01 ENCOUNTER — TELEPHONE (OUTPATIENT)
Dept: NEUROLOGY | Facility: CLINIC | Age: 29
End: 2019-07-01

## 2019-07-01 RX ORDER — DESVENLAFAXINE SUCCINATE 50 MG/1
50 TABLET, EXTENDED RELEASE ORAL DAILY
Qty: 30 TABLET | Refills: 11 | Status: SHIPPED | OUTPATIENT
Start: 2019-07-01 | End: 2020-02-12 | Stop reason: SDUPTHER

## 2019-07-01 NOTE — TELEPHONE ENCOUNTER
----- Message from Bridget Norris sent at 2019  1:01 PM CDT -----  Contact: self  Nazario Gill  MRN: [id number]  : 1990  PCP: Blake Hughes  Home Phone      [phone]  Work Phone      Not on file.  Mobile          [phone]      MESSAGE:  Pt states she is needing a refill on her PRISTIQ) 50 MG. Pt states it was for a two month supply but the pharmacy only gave her a one month supply last time. Pt would like to know if she can get multiple refills for one month supply only. Please advise, thanks.  Pharmacy: [facility] in [place] off [address]  Phone: [phone]

## 2019-07-01 NOTE — TELEPHONE ENCOUNTER
Patient states she needs a new prescription sent in saying, Take once daily, with the 50mg dosage of the Pristiq.   Please advise.

## 2019-07-02 ENCOUNTER — HOSPITAL ENCOUNTER (OUTPATIENT)
Dept: PULMONOLOGY | Facility: HOSPITAL | Age: 29
Discharge: HOME | End: 2019-07-02
Attending: NURSE PRACTITIONER
Payer: COMMERCIAL

## 2019-07-02 ENCOUNTER — OFFICE VISIT (OUTPATIENT)
Dept: NEUROLOGY | Facility: CLINIC | Age: 29
End: 2019-07-02
Payer: COMMERCIAL

## 2019-07-02 VITALS
RESPIRATION RATE: 14 BRPM | WEIGHT: 119.94 LBS | SYSTOLIC BLOOD PRESSURE: 104 MMHG | DIASTOLIC BLOOD PRESSURE: 78 MMHG | HEIGHT: 62 IN | BODY MASS INDEX: 22.07 KG/M2 | HEART RATE: 100 BPM

## 2019-07-02 DIAGNOSIS — F41.9 ANXIETY: ICD-10-CM

## 2019-07-02 DIAGNOSIS — R55 PRE-SYNCOPE: Primary | ICD-10-CM

## 2019-07-02 DIAGNOSIS — R55 PRE-SYNCOPE: ICD-10-CM

## 2019-07-02 PROCEDURE — 93226 XTRNL ECG REC<48 HR SCAN A/R: CPT

## 2019-07-02 PROCEDURE — 99999 PR PBB SHADOW E&M-EST. PATIENT-LVL IV: CPT | Mod: PBBFAC,,, | Performed by: NURSE PRACTITIONER

## 2019-07-02 PROCEDURE — 99214 PR OFFICE/OUTPT VISIT, EST, LEVL IV, 30-39 MIN: ICD-10-PCS | Mod: S$GLB,,, | Performed by: NURSE PRACTITIONER

## 2019-07-02 PROCEDURE — 93227 XTRNL ECG REC<48 HR R&I: CPT | Mod: ,,, | Performed by: INTERNAL MEDICINE

## 2019-07-02 PROCEDURE — 3008F BODY MASS INDEX DOCD: CPT | Mod: CPTII,S$GLB,, | Performed by: NURSE PRACTITIONER

## 2019-07-02 PROCEDURE — 99214 OFFICE O/P EST MOD 30 MIN: CPT | Mod: S$GLB,,, | Performed by: NURSE PRACTITIONER

## 2019-07-02 PROCEDURE — 93227 HOLTER MONITOR - 24 HOUR (CUPID ONLY): ICD-10-PCS | Mod: ,,, | Performed by: INTERNAL MEDICINE

## 2019-07-02 PROCEDURE — 99999 PR PBB SHADOW E&M-EST. PATIENT-LVL IV: ICD-10-PCS | Mod: PBBFAC,,, | Performed by: NURSE PRACTITIONER

## 2019-07-02 PROCEDURE — 3008F PR BODY MASS INDEX (BMI) DOCUMENTED: ICD-10-PCS | Mod: CPTII,S$GLB,, | Performed by: NURSE PRACTITIONER

## 2019-07-02 NOTE — PROGRESS NOTES
HPI: Nazario Gill is a 29 y.o. female with a long history of chronic migraine with aura (some hemisensory symptoms-tingling in the left arm and face with visual changes). She saw Dr. Ray and Dr. Sims (outside Neurologists) prior. She works at [employer].     She presents today before her scheduled follow up visit with complaints of lightheadedness, which began yesterday. She felt as if she was going to pass out. Denies reduced vision or hearing prior to this. No palpitations, nausea, diaphoresis. No room spinning sensation. She does currently have a sinus infection and stopped taking Cefdinir, as she believed this could be contributing to this. She was taking this since Friday for 3 days. She had also been using Afrin.     She denies any overt anxiety, but has been clenching her jaw more since reducing her Pristiq. Her Pristiq was lowered to 50 mg at her last visit.     She feels as if she cannot sleep unless she takes both Doxepin .     Headache remain well controlled on Aimovig for the most part. She has not needed Fioricet, but does take Diclofenac or Ibuprofen to abort breakthrough headaches occasionally.     Prior, she has tried:   Topamax-excess weight loss  Pamelor-failed  Zonegran-failed  Propranolol-not fully effective  Doxepin-ineffective but helps insomnia  Pristiq and Wellbutrin-did help anxiety   triptans-could not tolerate  Tizanidine-helps insomnia    She stopped Propranolol, as she thought that Dr. Salcido advised her to stop, but no worsening of her headaches off of this.     Review of Systems   Constitutional: Negative for fever.   HENT: Negative for nosebleeds.    Eyes: Negative for double vision.   Respiratory: Negative for hemoptysis.    Cardiovascular: Negative for palpitations and leg swelling.   Gastrointestinal: Negative for blood in stool.   Genitourinary: Negative for hematuria.   Musculoskeletal: Negative for falls.   Skin: Negative for rash.   Neurological: Positive  for headaches. Negative for seizures.        Near syncope   Psychiatric/Behavioral: Negative for memory loss.       I have reviewed all of this patient's past medical and surgical histories as well as family and social histories and active allergies and medications as documented in the electronic medical record.    Exam:  Gen Appearance, well developed/nourished in no apparent distress  CV: 2+ distal pulses with no edema or swelling  Neuro:  MS: Awake, alert, oriented to place, person, time, situation. Sustains attention. Recent/remote memory intact, Language is full to spontaneous speech/repetition/naming/comprehension. Fund of Knowledge is full  CN: Optic discs are flat with normal vasculature, PERRL, Extraoccular movements and visual fields are full; very mild fast beat horizontal nystagmus with left gaze. Normal facial sensation and strength, Hearing symmetric, Tongue and Palate are midline and strong. Shoulder Shrug symmetric and strong.  Motor: Normal bulk, tone, no abnormal movements. 5/5 strength bilateral upper/lower extremities with 2+ reflexes  Sensory: symmetric to light touch, pain, temp, and vibration Romberg negative  Cerebellar: Finger-nose,Heal-shin, Rapid alternating movements intact  Gait: Normal stance, no ataxia    Labs:   2019 CBC unremarkable      Assessment/Plan: Nazario Gill is a 29 y.o. female with a long history of chronic migraine with aura (some hemisensory symptoms)    Symptoms consistent with presyncope. Very mild nystagmus, but symptoms are not consistent with vertigo.   I recommend:   1. CBC, CMP, TSH, T4, serum HCG  2. 24 hour Holter  3. No need for neuroimaging at this time. Prior imaging including MRI brain was normal.  4. Continue Aimovig for migraine prevention. She is off of Propranolol, with no worsening of her headaches.   5. She reduced Pristiq with no worsening of her headaches or anxiety, but continues on 50 mg, in addition to Wellbutrin. Will attempt to wean  further at her next visit.   6. She continues on both Doxepin and Tizanidine for insomnia. Will attempt to reduce polypharmacy at next visit and find a single sleep aid that is more suitable for her if needed.   7. Zanaflex is helpful for neck tension.   8. Continue compazine for nausea.   9. Continue Diclofenac +/- Fioricet +/- Toradol spray PRN. Limit use of abortive therapy to 2 days per week to prevent medication rebound headaches.   10. Avoid triptans which she could not tolerate prior and given her neurosensory symptoms with prior headaches.     RTC 6 weeks

## 2019-07-03 ENCOUNTER — LAB VISIT (OUTPATIENT)
Dept: LAB | Facility: HOSPITAL | Age: 29
End: 2019-07-03
Attending: NURSE PRACTITIONER
Payer: COMMERCIAL

## 2019-07-03 DIAGNOSIS — R94.6 ABNORMAL THYROID FUNCTION TEST: ICD-10-CM

## 2019-07-03 DIAGNOSIS — R55 PRE-SYNCOPE: Primary | ICD-10-CM

## 2019-07-03 DIAGNOSIS — R94.6 ABNORMAL THYROID FUNCTION TEST: Primary | ICD-10-CM

## 2019-07-03 DIAGNOSIS — R55 PRE-SYNCOPE: ICD-10-CM

## 2019-07-03 LAB
HCG SERPL QL: NEGATIVE
T4 FREE SERPL-MCNC: 1.05 NG/DL (ref 0.71–1.51)

## 2019-07-03 PROCEDURE — 36415 COLL VENOUS BLD VENIPUNCTURE: CPT

## 2019-07-03 PROCEDURE — 84439 ASSAY OF FREE THYROXINE: CPT

## 2019-07-03 PROCEDURE — 84703 CHORIONIC GONADOTROPIN ASSAY: CPT

## 2019-07-03 PROCEDURE — 84481 FREE ASSAY (FT-3): CPT

## 2019-07-03 PROCEDURE — 86376 MICROSOMAL ANTIBODY EACH: CPT

## 2019-07-03 PROCEDURE — 84480 ASSAY TRIIODOTHYRONINE (T3): CPT

## 2019-07-04 LAB
T3 SERPL-MCNC: 131 NG/DL (ref 60–180)
T3FREE SERPL-MCNC: 3 PG/ML (ref 2.3–4.2)
THYROPEROXIDASE IGG SERPL-ACNC: <6 IU/ML

## 2019-07-08 LAB
OHS CV EVENT MONITOR DAY: 0
OHS CV HOLTER LENGTH DECIMAL HOURS: 48
OHS CV HOLTER LENGTH HOURS: 48
OHS CV HOLTER LENGTH MINUTES: 0

## 2019-07-24 NOTE — TELEPHONE ENCOUNTER
----- Message from Estefani Garsia sent at 2019 10:33 AM CDT -----  Contact: PATIENT  Nazario Gill  MRN: [id number]  : 1990  PCP: Blake Hughes  Home Phone      [phone]  Work Phone      Not on file.  Mobile          [phone]      MESSAGE: Patient needs a refill on Amovig 140 mg & Wellburtrin 75 mg BID sent to [facility].      Phone: [phone]

## 2019-07-24 NOTE — TELEPHONE ENCOUNTER
Patient is asking to switch pharmacy. She states that her previous neurologist prescribed the Wellbutrin and this would be taken over by you as discussed. Please advise.

## 2019-07-25 RX ORDER — BUPROPION HYDROCHLORIDE 75 MG/1
75 TABLET ORAL 2 TIMES DAILY
Qty: 60 TABLET | Refills: 5 | Status: SHIPPED | OUTPATIENT
Start: 2019-07-25 | End: 2020-01-20

## 2019-07-29 ENCOUNTER — OCCUPATIONAL HEALTH (OUTPATIENT)
Dept: URGENT CARE | Facility: CLINIC | Age: 29
End: 2019-07-29

## 2019-07-29 DIAGNOSIS — Z02.83 ENCOUNTER FOR DRUG SCREENING: ICD-10-CM

## 2019-07-29 PROCEDURE — 80305 PR HAIR COLLECTION ONLY: ICD-10-PCS | Mod: S$GLB,,, | Performed by: NURSE PRACTITIONER

## 2019-07-29 PROCEDURE — 80305 DRUG TEST PRSMV DIR OPT OBS: CPT | Mod: S$GLB,,, | Performed by: NURSE PRACTITIONER

## 2019-09-17 RX ORDER — DOXEPIN HYDROCHLORIDE 10 MG/1
CAPSULE ORAL
Qty: 60 CAPSULE | Refills: 2 | Status: SHIPPED | OUTPATIENT
Start: 2019-09-17 | End: 2019-12-23

## 2019-09-17 NOTE — TELEPHONE ENCOUNTER
Patient states that her prior neurologist was filling Sinequin 10 mg and she spoke with Dr Salcido at the last visit about it and was told that we would refill. Please advise.

## 2019-09-17 NOTE — TELEPHONE ENCOUNTER
----- Message from Hank Barboza sent at 9/17/2019  8:11 AM CDT -----  Contact: pt   PT needs a refill ondoxepin (SINEQUAN) 10 MG capsule  2  called into pharmacy at  [facility] [phone]  Pt can be reached at  [phone]

## 2019-09-17 NOTE — TELEPHONE ENCOUNTER
I never prescribed this to her, and don't see where this came from this clinic. It appears to be prescribed by an outside provider, whom she may obtain refills from.

## 2019-09-17 NOTE — TELEPHONE ENCOUNTER
I see that you mentioned this medication in your last note, is this something you would refill for this patient??

## 2019-12-23 RX ORDER — DOXEPIN HYDROCHLORIDE 10 MG/1
CAPSULE ORAL
Qty: 60 CAPSULE | Refills: 0 | Status: SHIPPED | OUTPATIENT
Start: 2019-12-23 | End: 2020-01-23

## 2019-12-30 ENCOUNTER — OFFICE VISIT (OUTPATIENT)
Dept: NEUROLOGY | Facility: CLINIC | Age: 29
End: 2019-12-30
Payer: COMMERCIAL

## 2019-12-30 VITALS
SYSTOLIC BLOOD PRESSURE: 96 MMHG | BODY MASS INDEX: 24.06 KG/M2 | DIASTOLIC BLOOD PRESSURE: 66 MMHG | RESPIRATION RATE: 16 BRPM | WEIGHT: 135.81 LBS | HEART RATE: 88 BPM | HEIGHT: 63 IN

## 2019-12-30 DIAGNOSIS — F41.9 ANXIETY: ICD-10-CM

## 2019-12-30 DIAGNOSIS — R55 PRE-SYNCOPE: ICD-10-CM

## 2019-12-30 PROCEDURE — 99214 OFFICE O/P EST MOD 30 MIN: CPT | Mod: S$GLB,,, | Performed by: PSYCHIATRY & NEUROLOGY

## 2019-12-30 PROCEDURE — 99999 PR PBB SHADOW E&M-EST. PATIENT-LVL III: CPT | Mod: PBBFAC,,, | Performed by: PSYCHIATRY & NEUROLOGY

## 2019-12-30 PROCEDURE — 99999 PR PBB SHADOW E&M-EST. PATIENT-LVL III: ICD-10-PCS | Mod: PBBFAC,,, | Performed by: PSYCHIATRY & NEUROLOGY

## 2019-12-30 PROCEDURE — 3008F BODY MASS INDEX DOCD: CPT | Mod: CPTII,S$GLB,, | Performed by: PSYCHIATRY & NEUROLOGY

## 2019-12-30 PROCEDURE — 3008F PR BODY MASS INDEX (BMI) DOCUMENTED: ICD-10-PCS | Mod: CPTII,S$GLB,, | Performed by: PSYCHIATRY & NEUROLOGY

## 2019-12-30 PROCEDURE — 99214 PR OFFICE/OUTPT VISIT, EST, LEVL IV, 30-39 MIN: ICD-10-PCS | Mod: S$GLB,,, | Performed by: PSYCHIATRY & NEUROLOGY

## 2019-12-30 NOTE — PROGRESS NOTES
HPI: Nazario Gill is a 29 y.o. female with headache  Since the last visit with me, the patient has been seeing NP, Celeste Harden, in Neurology in this clinic  She failed to follow up closely after reporting a presyncopal symptoms. Labs and Holter monitor were unremarkable (see below)  She has not had any further symptoms.     Headaches are very well controlled currently. She only rarely has head pain and nothing she would consider migraine. No sensory symptoms  Using only OTC meds PRN vs Compazine  Still on Doxepin, Tizanidine, Wellbutrin and Prestiq  She could not tolerate off of Doxepin when she ran out. Wellbutrin helps with sleep. She would consider reducing dose of Prestiq at any point.  Still takes tizanadine for neck pain.  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    Review of Systems   Constitutional: Negative for fever.   HENT: Negative for nosebleeds.    Eyes: Negative for double vision.   Respiratory: Negative for hemoptysis.    Cardiovascular: Negative for leg swelling.   Gastrointestinal: Negative for blood in stool.   Genitourinary: Negative for hematuria.   Musculoskeletal: Negative for falls.   Skin: Negative for rash.   Neurological: Negative for seizures.   Psychiatric/Behavioral: Negative for memory loss.         I have reviewed all of this patient's past medical and surgical histories as well as family and social histories and active allergies and medications as documented in the electronic medical record.            Exam:  Gen Appearance, well developed/nourished in no apparent distress  CV: 2+ distal pulses with no edema or swelling  Neuro:  MS: Awake, alert, oriented to place, person, time, situation. Sustains attention. Recent/remote memory intact, Language is full to spontaneous speech/repetition/naming/comprehension. Fund of Knowledge is full  CN: Optic discs are  flat with normal vasculature, PERRL, Extraoccular movements and visual fields are full. Normal facial sensation and strength, Hearing symmetric, Tongue and Palate are midline and strong. Shoulder Shrug symmetric and strong.  Motor: Normal bulk, tone, no abnormal movements. 5/5 strength bilateral upper/lower extremities with 2+ reflexes  Sensory: symmetric to light touch, pain, temp, and vibration Romberg negative  Cerebellar: Finger-nose,Heal-shin, Rapid alternating movements intact  Gait: Normal stance, no ataxia      Labs: 2019 CBC unremarkable  2019 CBC, CMP, TSH, T4 and other thyroid associated labs ok, serum HCG negative    2019 Holter monitor normal  Assessment/Plan: Nazario Gill is a 29 y.o. female with a long history of chronic migraine with aura (some hemisensory symptoms)    I recommend:   1.Continue Aimovig which is working well for her migraine prevention. She is off of Propranolol, with no worsening of her headaches.  2. She continues on both Doxepin for insomnia and Tizanidine for neck tension. She would like to continue with this as well as Wellbutrin for mood (well tolerated)  3. She would like to stop Pristiq: Reduce to one QOD for 2 weeks then stop. This may further improve her sleep  4. Continue compazine for nausea/ headache. No longer needing Diclofenac or Fioricet or Toradol spray. Using more OTC meds.   5. She will continue diclofenac +/- fioricet +/- toradol spray PRN but avoid frequent use to avoid rebound headaches  6. Avoid triptans which she could not tolerate prior and given her neurosensory symptoms with prior headaches.   7. She had Symptoms consistent with presyncope in 2019.   -Labs and Holter monitor unremarkable and she has had no further symptoms  8. Note: Prior MRI reportedly unremarkable      RTC 6 months

## 2020-01-20 RX ORDER — BUPROPION HYDROCHLORIDE 75 MG/1
TABLET ORAL
Qty: 60 TABLET | Refills: 5 | Status: SHIPPED | OUTPATIENT
Start: 2020-01-20 | End: 2020-07-15 | Stop reason: SDUPTHER

## 2020-01-23 RX ORDER — DOXEPIN HYDROCHLORIDE 10 MG/1
CAPSULE ORAL
Qty: 60 CAPSULE | Refills: 0 | Status: SHIPPED | OUTPATIENT
Start: 2020-01-23 | End: 2020-02-10

## 2020-01-23 RX ORDER — ERENUMAB-AOOE 140 MG/ML
INJECTION, SOLUTION SUBCUTANEOUS
Qty: 1 SYRINGE | Refills: 12 | Status: SHIPPED | OUTPATIENT
Start: 2020-01-23 | End: 2020-05-25 | Stop reason: SDUPTHER

## 2020-02-10 ENCOUNTER — TELEPHONE (OUTPATIENT)
Dept: NEUROLOGY | Facility: CLINIC | Age: 30
End: 2020-02-10

## 2020-02-10 RX ORDER — DOXEPIN HYDROCHLORIDE 10 MG/1
CAPSULE ORAL
Qty: 60 CAPSULE | Refills: 3 | Status: SHIPPED | OUTPATIENT
Start: 2020-02-10 | End: 2020-07-06

## 2020-02-10 NOTE — TELEPHONE ENCOUNTER
Patient states that since discontinuing Pristiq 2 months ago, she is more aggravated, irritable and contreras. She would like to know if she should be put back on it or put on something else. Please advise.

## 2020-02-10 NOTE — TELEPHONE ENCOUNTER
----- Message from Rona Parker MA sent at 2/10/2020 11:45 AM CST -----  Contact: Patient  Nazario Gill  MRN: [id number]  : 1990  PCP: Blake Hughes  Home Phone      [phone]  Work Phone      Not on file.  Mobile          [phone]      MESSAGE:  Patient is asking to speak to you in reference to Pristiq 50 mg.     Phone: [phone]

## 2020-02-12 NOTE — TELEPHONE ENCOUNTER
----- Message from Bridget Norris sent at 2020  1:22 PM CST -----  Contact: self  Nazario Gill  MRN: [id number]  : 1990  PCP: Blake Hughes  Home Phone      [phone]  Work Phone      Not on file.  Mobile          [phone]      MESSAGE:  Pt would like to talk to nurse regarding Prestiq medication.  Phone: [phone]

## 2020-02-13 RX ORDER — DESVENLAFAXINE SUCCINATE 50 MG/1
50 TABLET, EXTENDED RELEASE ORAL DAILY
Qty: 30 TABLET | Refills: 11 | Status: SHIPPED | OUTPATIENT
Start: 2020-02-13 | End: 2021-10-23

## 2020-02-14 ENCOUNTER — TELEPHONE (OUTPATIENT)
Dept: NEUROLOGY | Facility: CLINIC | Age: 30
End: 2020-02-14

## 2020-02-14 NOTE — TELEPHONE ENCOUNTER
----- Message from Estefani Garsia sent at 2020  8:10 AM CST -----  Contact: PATIENT  Nazario Gill  MRN: [id number]  : 1990  PCP: Blake Hughes  Home Phone      [phone]  Work Phone      Not on file.  Mobile          [phone]      MESSAGE: Patient states that she started taking her Pristique again 2 days ago and has now started having migraines again.  She would like to get Dr. Salcido's recommendations.          Phone: [phone]     Quality 431: Preventive Care And Screening: Unhealthy Alcohol Use - Screening: Patient screened for unhealthy alcohol use using a single question and scores less than 2 times per year Quality 110: Preventive Care And Screening: Influenza Immunization: Influenza immunization was not ordered or administered, reason not given Quality 130: Documentation Of Current Medications In The Medical Record: Current Medications Documented Quality 226: Preventive Care And Screening: Tobacco Use: Screening And Cessation Intervention: Patient screened for tobacco use and is an ex/non-smoker Detail Level: Detailed

## 2020-02-14 NOTE — TELEPHONE ENCOUNTER
Patient restarted Pristiq 50 mg 2 days ago. She has had headaches ever since. Yesterday, she had a migraine. Per medcard, patient Rx'd Fioricet and Diclofenac but patient does not have either of these medications. She is asking for refills of Sprix Spray (originally given by previous neurologist) and further recommendations. Med card updated. Please advise.

## 2020-02-17 RX ORDER — KETOROLAC TROMETHAMINE 15.75 MG/1
15.75 SPRAY, METERED NASAL EVERY 6 HOURS
COMMUNITY
End: 2020-02-17 | Stop reason: SDUPTHER

## 2020-02-17 RX ORDER — KETOROLAC TROMETHAMINE 15.75 MG/1
15.75 SPRAY, METERED NASAL EVERY 6 HOURS
Qty: 1 EACH | Refills: 0 | Status: SHIPPED | OUTPATIENT
Start: 2020-02-17 | End: 2020-05-20

## 2020-02-17 NOTE — TELEPHONE ENCOUNTER
Ok for spray. Forward dosing to me.   Please let her know she will need to be seen in clinic if headaches persist for longer than a week or so

## 2020-02-20 ENCOUNTER — OFFICE VISIT (OUTPATIENT)
Dept: NEUROLOGY | Facility: CLINIC | Age: 30
End: 2020-02-20
Payer: COMMERCIAL

## 2020-02-20 VITALS
WEIGHT: 134.94 LBS | OXYGEN SATURATION: 98 % | HEIGHT: 63 IN | HEART RATE: 91 BPM | RESPIRATION RATE: 14 BRPM | SYSTOLIC BLOOD PRESSURE: 110 MMHG | DIASTOLIC BLOOD PRESSURE: 72 MMHG | BODY MASS INDEX: 23.91 KG/M2

## 2020-02-20 DIAGNOSIS — T88.7XXA SIDE EFFECT OF MEDICATION: ICD-10-CM

## 2020-02-20 DIAGNOSIS — F41.9 ANXIETY: ICD-10-CM

## 2020-02-20 PROCEDURE — 99214 OFFICE O/P EST MOD 30 MIN: CPT | Mod: S$GLB,,, | Performed by: PSYCHIATRY & NEUROLOGY

## 2020-02-20 PROCEDURE — 99999 PR PBB SHADOW E&M-EST. PATIENT-LVL III: CPT | Mod: PBBFAC,,, | Performed by: PSYCHIATRY & NEUROLOGY

## 2020-02-20 PROCEDURE — 3008F BODY MASS INDEX DOCD: CPT | Mod: CPTII,S$GLB,, | Performed by: PSYCHIATRY & NEUROLOGY

## 2020-02-20 PROCEDURE — 3008F PR BODY MASS INDEX (BMI) DOCUMENTED: ICD-10-PCS | Mod: CPTII,S$GLB,, | Performed by: PSYCHIATRY & NEUROLOGY

## 2020-02-20 PROCEDURE — 99999 PR PBB SHADOW E&M-EST. PATIENT-LVL III: ICD-10-PCS | Mod: PBBFAC,,, | Performed by: PSYCHIATRY & NEUROLOGY

## 2020-02-20 PROCEDURE — 99214 PR OFFICE/OUTPT VISIT, EST, LEVL IV, 30-39 MIN: ICD-10-PCS | Mod: S$GLB,,, | Performed by: PSYCHIATRY & NEUROLOGY

## 2020-02-20 NOTE — PROGRESS NOTES
HPI: Nazario Gill is a 29 y.o. female with headache  Since the last visit with me, the patient called to state she was more irritable off of Prestiq and wanted to resume  However, she called at a later date stating that since she restarted Prestiq, her migraines increased   She reports numbness in the face with headaches (this is chronic with her headaches over time)    She states she had been doing well until a month ago when she was crying easily and was more anxious. Being back on Prestiq has helped all of this.  However, she has had increased headaches again.  Sprix helps headaches currently. Has headaches on either side, often starts on the left.   No fainting    Review of Systems   Constitutional: Negative for fever.   HENT: Negative for nosebleeds.    Eyes: Negative for double vision.   Respiratory: Negative for hemoptysis.    Cardiovascular: Negative for leg swelling.   Gastrointestinal: Negative for abdominal pain and blood in stool.   Genitourinary: Negative for hematuria.   Musculoskeletal: Negative for falls and myalgias.   Skin: Negative for rash.   Neurological: Positive for headaches.   Psychiatric/Behavioral: Negative for suicidal ideas.         I have reviewed all of this patient's past medical and surgical histories as well as family and social histories and active allergies and medications as documented in the electronic medical record.            Exam:  Gen Appearance, well developed/nourished in no apparent distress  CV: 2+ distal pulses with no edema or swelling  Neuro:  MS: Awake, alert, oriented to place, person, time, situation. Sustains attention. Recent/remote memory intact, Language is full to spontaneous speech/repetition/naming/comprehension. Fund of Knowledge is full  CN: Optic discs are flat with normal vasculature, PERRL, Extraoccular movements and visual fields are full. Normal facial sensation and strength, Hearing symmetric, Tongue and Palate are midline and strong.  Shoulder Shrug symmetric and strong.  Motor: Normal bulk, tone, no abnormal movements. 5/5 strength bilateral upper/lower extremities with 2+ reflexes  Sensory: symmetric to light touch, pain, temp, and vibration Romberg negative  Cerebellar: Finger-nose,Heal-shin, Rapid alternating movements intact  Gait: Normal stance, no ataxia      Labs: 2019 CBC unremarkable  2019 CBC, CMP, TSH, T4 and other thyroid associated labs ok, serum HCG negative    2019 Holter monitor normal  Assessment/Plan: Nazario Gill is a 29 y.o. female with a long history of chronic migraine with aura (some hemisensory symptoms)    I recommend:   1.Continue Aimovig which is worked well for her migraine prevention. She is off of Propranolol, with no worsening of her headaches.  2. She continues on both Doxepin for insomnia and Tizanidine for neck tension. She would like to continue with this as well as Wellbutrin for mood (well tolerated)  3. She had worse mood off of prestiq but then felt her headaches worsen with resumption of use (but this medication does greatly help her mood)  -concern from me is some possible serotonin excess symptoms here. No signs of serotonin syndrome, but headache may be related to her use of 3 medications for mood.   -psychiatry consult for mood disorder as her headaches were well controlled with Aimovig prior to changes in medications. In the meantime, try to reduce Prestiq to 1/2 dose if able until then  4. Continue compazine for nausea/ headache. No longer needing Diclofenac or Fioricet and can use Toradol spray PRN vs OTC meds.   5. She will continue diclofenac +/- fioricet +/- toradol spray PRN but avoid frequent use to avoid rebound headaches  6. Avoid triptans which she could not tolerate prior and given her neurosensory symptoms with  headaches.   7. She had Symptoms consistent with presyncope in 2019.   -Labs and Holter monitor unremarkable and she has had no further symptoms  8. Note: Prior MRI  reportedly unremarkable      RTC as scheduled

## 2020-02-21 ENCOUNTER — PATIENT MESSAGE (OUTPATIENT)
Dept: PAIN MEDICINE | Facility: CLINIC | Age: 30
End: 2020-02-21

## 2020-03-19 RX ORDER — TIZANIDINE 4 MG/1
4 TABLET ORAL EVERY 6 HOURS PRN
Qty: 90 TABLET | Refills: 2 | Status: SHIPPED | OUTPATIENT
Start: 2020-03-19 | End: 2020-05-20

## 2020-03-20 ENCOUNTER — TELEPHONE (OUTPATIENT)
Dept: NEUROLOGY | Facility: CLINIC | Age: 30
End: 2020-03-20

## 2020-03-20 NOTE — TELEPHONE ENCOUNTER
----- Message from Estefani Garsia sent at 3/20/2020  9:44 AM CDT -----  Contact: PATIENT  Nazario Gill  MRN: [id number]  : 1990  PCP: Blake Hughes  Home Phone      [phone]  Work Phone      Not on file.  Mobile          [phone]      MESSAGE: Patient is following up on prior authorization regarding the patients Aimovig, the insurance company is telling they have not received anything from the office as of yet.          Phone: [phone]

## 2020-03-20 NOTE — TELEPHONE ENCOUNTER
Submitted PA successfully, spoke to pt, informed her that as soon as we receive the denial or approval we will call and inform her.

## 2020-03-30 ENCOUNTER — PATIENT MESSAGE (OUTPATIENT)
Dept: NEUROLOGY | Facility: CLINIC | Age: 30
End: 2020-03-30

## 2020-05-20 ENCOUNTER — OFFICE VISIT (OUTPATIENT)
Dept: NEUROLOGY | Facility: CLINIC | Age: 30
End: 2020-05-20
Payer: COMMERCIAL

## 2020-05-20 ENCOUNTER — TELEPHONE (OUTPATIENT)
Dept: PHARMACY | Facility: CLINIC | Age: 30
End: 2020-05-20

## 2020-05-20 VITALS
BODY MASS INDEX: 23.25 KG/M2 | WEIGHT: 131.19 LBS | HEIGHT: 63 IN | RESPIRATION RATE: 10 BRPM | HEART RATE: 80 BPM | DIASTOLIC BLOOD PRESSURE: 80 MMHG | SYSTOLIC BLOOD PRESSURE: 110 MMHG

## 2020-05-20 DIAGNOSIS — R11.0 NAUSEA: ICD-10-CM

## 2020-05-20 DIAGNOSIS — F41.9 ANXIETY: ICD-10-CM

## 2020-05-20 PROCEDURE — 99999 PR PBB SHADOW E&M-EST. PATIENT-LVL IV: CPT | Mod: PBBFAC,,, | Performed by: NURSE PRACTITIONER

## 2020-05-20 PROCEDURE — 99214 OFFICE O/P EST MOD 30 MIN: CPT | Mod: S$GLB,,, | Performed by: NURSE PRACTITIONER

## 2020-05-20 PROCEDURE — 99999 PR PBB SHADOW E&M-EST. PATIENT-LVL IV: ICD-10-PCS | Mod: PBBFAC,,, | Performed by: NURSE PRACTITIONER

## 2020-05-20 PROCEDURE — 3008F PR BODY MASS INDEX (BMI) DOCUMENTED: ICD-10-PCS | Mod: CPTII,S$GLB,, | Performed by: NURSE PRACTITIONER

## 2020-05-20 PROCEDURE — 3008F BODY MASS INDEX DOCD: CPT | Mod: CPTII,S$GLB,, | Performed by: NURSE PRACTITIONER

## 2020-05-20 PROCEDURE — 99214 PR OFFICE/OUTPT VISIT, EST, LEVL IV, 30-39 MIN: ICD-10-PCS | Mod: S$GLB,,, | Performed by: NURSE PRACTITIONER

## 2020-05-20 RX ORDER — BUTALBITAL, ACETAMINOPHEN AND CAFFEINE 50; 325; 40 MG/1; MG/1; MG/1
1 TABLET ORAL DAILY PRN
Qty: 9 TABLET | Refills: 5 | Status: SHIPPED | OUTPATIENT
Start: 2020-05-20 | End: 2020-06-19

## 2020-05-20 RX ORDER — ONDANSETRON 8 MG/1
8 TABLET, ORALLY DISINTEGRATING ORAL
Qty: 9 TABLET | Refills: 3 | Status: SHIPPED | OUTPATIENT
Start: 2020-05-20 | End: 2021-10-23

## 2020-05-20 RX ORDER — KETOROLAC TROMETHAMINE 15.75 MG/1
15.75 SPRAY, METERED NASAL DAILY PRN
Qty: 1 EACH | Refills: 2 | Status: SHIPPED | OUTPATIENT
Start: 2020-05-20

## 2020-05-20 NOTE — PROGRESS NOTES
"HPI: Nazario Gill is a 30 y.o. female with headache and anxiety. Prior pre-syncope with unremarkable workup. She was followed by Dr. Ray prior.     She presents today for a follow up visit. She was referred for a Psychiatry consult for medication management, given her use of Doxepin, Wellbutrin, and Pristiq for anxiety, with increased headaches once Pristiq was added on two separate occasions. She has not been able to see Psychiatry yet. Her anxiety improved when restarting her Pristiq, but headaches and insomnia worsened.     She verbalized frustration over having to see more than one provider for her medical issues and would like for Neurology to manage all of her issues/medications. Splitting noted today.     She continues with a daily headache. Headaches last for an hours at a time. Has headaches on either side, often starts on the left. She takes Tylenol ES every day at times, but not every week. Compazine is ineffective for headaches, but does help her nausea.     Her Sprix was refilled at her last visit, but this will only be covered if sent to a specialty pharmacy. She is out of Fioricet, but this was helpful prior.     She takes Tizanidine in the evening for "insomnia", but this is ineffective. She denies having any neck tension. Her insomnia is worse lately. She sometimes takes more Doxepin, which is minimally effective.     No fainting    Review of Systems   Constitutional: Negative for fever.   HENT: Negative for nosebleeds.    Eyes: Negative for double vision.   Respiratory: Negative for hemoptysis.    Cardiovascular: Negative for leg swelling.   Gastrointestinal: Negative for abdominal pain and blood in stool.   Genitourinary: Negative for hematuria.   Musculoskeletal: Negative for falls and myalgias.   Skin: Negative for rash.   Neurological: Positive for headaches.   Psychiatric/Behavioral: Negative for suicidal ideas. The patient is nervous/anxious and has insomnia.        I have " reviewed all of this patient's past medical and surgical histories as well as family and social histories and active allergies and medications as documented in the electronic medical record.    Exam:  Gen Appearance, well developed/nourished in no apparent distress  CV: 2+ distal pulses with no edema or swelling  Neuro:  MS: Awake, alert, oriented to place, person, time, situation. Sustains attention. Recent/remote memory intact, Language is full to spontaneous speech/repetition/naming/comprehension. Fund of Knowledge is full  CN: Optic discs are flat with normal vasculature, PERRL, Extraoccular movements and visual fields are full. Normal facial sensation and strength, Hearing symmetric, Tongue and Palate are midline and strong. Shoulder Shrug symmetric and strong.  Motor: Normal bulk, tone, no abnormal movements. 5/5 strength bilateral upper/lower extremities with 2+ reflexes  Sensory: symmetric to light touch, pain, temp, and vibration Romberg negative  Cerebellar: Finger-nose,Heal-shin, Rapid alternating movements intact  Gait: Normal stance, no ataxia    Labs: 2019 CBC unremarkable  2019 CBC, CMP, TSH, T4 and other thyroid associated labs ok, serum HCG negative    2019 Holter monitor normal  Assessment/Plan: Nazario Gill is a 30 y.o. female with a long history of chronic migraine with aura (some hemisensory symptoms)    I recommend:   1.Continue Aimovig which is worked well for her migraine prevention.   -She is off of Propranolol, with no worsening of her headaches.  2. She continues Wellbutrin and Pristiq for mood, and Doxepin for insomnia.   3. She had worse mood off of Pristiq but then felt her headaches and insomnia worsened with resumption of use (but this medication does greatly help her mood).  -concern from me is some possible serotonin excess symptoms here. No signs of serotonin syndrome, but headache may be related to her use of 3 medications for mood.   -Psychiatry consult for mood disorder  "as her headaches were well controlled with Aimovig prior to changes in medications. In the meantime, try to reduce Pristiq to 1/2 dose if able until then.   -She verbalizes frustration over not having all of her medications managed by this clinic. I advised that given the amount of mood medications that she was on and that side effects that she was experiencing, that she would be better served by Psychiatry.   4. She is having worsening insomnia, somewhat helped with Doxepin. She cannot take Melatonin. Please discuss further with Psychiatry.    5. Stop Compazine, as this is ineffective for headaches, but helps nausea, and start Zofran prn nausea with headaches.   6. Send Toradol spray to [facility].   7. Stop Tizanidine, as she is not having any C-spine complaints.   8. Continue Fioricet prn headache.   -Discussed that she should not be taking any abortive agent more than two days in a row or three days per week with combined abortive agents.   -Note frequent use of Tylenol ES, which can contribute to medication rebound headaches, as discussed.   -No longer needing Diclofenac or Fioricet and can use Toradol spray PRN vs OTC meds.   - Avoid triptans which she could not tolerate prior and given her neurosensory symptoms with  headaches.   7. She had Symptoms consistent with presyncope in 2019.   -Labs and Holter monitor unremarkable and she has had no further symptoms  8. Note: Prior MRI reportedly unremarkable.    Keep a headache diary until the next visit.     FU 3 months    "note not shared"     "

## 2020-05-20 NOTE — PATIENT INSTRUCTIONS
You may take something to abort your headaches 3 days per week max with your combined agents, but no more than 2 days in a row.

## 2020-05-20 NOTE — TELEPHONE ENCOUNTER
Informed Patient  that [facility] received prescription for Sprix and benefits investigation is required.  [facility] will be back in touch once insurance determination is received.

## 2020-05-25 ENCOUNTER — TELEPHONE (OUTPATIENT)
Dept: PHARMACY | Facility: CLINIC | Age: 30
End: 2020-05-25

## 2020-05-25 RX ORDER — ERENUMAB-AOOE 140 MG/ML
140 INJECTION, SOLUTION SUBCUTANEOUS
Qty: 1 ML | Refills: 11 | Status: CANCELLED | OUTPATIENT
Start: 2020-05-25

## 2020-05-25 NOTE — TELEPHONE ENCOUNTER
Patient has been denied Aimovig on multiple occasions. Staff attempting to complete PA through cover my meds. Patient agrees for this medication to be sent to [facility] In attempt for better outcome.      Please advise.

## 2020-05-28 ENCOUNTER — TELEPHONE (OUTPATIENT)
Dept: PSYCHIATRY | Facility: CLINIC | Age: 30
End: 2020-05-28

## 2020-06-20 ENCOUNTER — TELEPHONE (OUTPATIENT)
Dept: PHARMACY | Facility: CLINIC | Age: 30
End: 2020-06-20

## 2020-06-20 NOTE — TELEPHONE ENCOUNTER
Pt contacted [facility] for initial Shipment of Aimovig on . Pt is experienced to therapy but has been off therapy since March due to insurance issues. Pt declined injection training and initial consult with [facility] since she is comfortable with the injection process. Pt will inject at same dose as before-140mg monthly. Shipping  for patient to receive . Patient said she will start medication on . Patient verified address. Copay is $5 in [address], [facility]. [facility] refill process explained to patient. Patient voiced understanding. No further questions or concerns. Verified patient name and  for HIPAA purposes.

## 2020-07-14 ENCOUNTER — TELEPHONE (OUTPATIENT)
Dept: PHARMACY | Facility: CLINIC | Age: 30
End: 2020-07-14

## 2020-07-16 RX ORDER — BUPROPION HYDROCHLORIDE 75 MG/1
75 TABLET ORAL 2 TIMES DAILY
Qty: 60 TABLET | Refills: 5 | Status: SHIPPED | OUTPATIENT
Start: 2020-07-16 | End: 2021-01-14

## 2020-08-11 ENCOUNTER — TELEPHONE (OUTPATIENT)
Dept: PHARMACY | Facility: CLINIC | Age: 30
End: 2020-08-11

## 2020-09-15 ENCOUNTER — TELEPHONE (OUTPATIENT)
Dept: PHARMACY | Facility: CLINIC | Age: 30
End: 2020-09-15

## 2020-10-21 ENCOUNTER — SPECIALTY PHARMACY (OUTPATIENT)
Dept: PHARMACY | Facility: CLINIC | Age: 30
End: 2020-10-21

## 2020-11-20 ENCOUNTER — SPECIALTY PHARMACY (OUTPATIENT)
Dept: PHARMACY | Facility: CLINIC | Age: 30
End: 2020-11-20

## 2020-11-20 NOTE — TELEPHONE ENCOUNTER
11/20 [facility]  Refill attempt for aimovig, pt reports that her doctor has changed her medicine and she gets this from another pharmacy.  Will escalated to the pharmacistAb

## 2021-07-28 ENCOUNTER — TELEPHONE (OUTPATIENT)
Dept: NEUROLOGY | Facility: CLINIC | Age: 31
End: 2021-07-28

## 2021-10-07 ENCOUNTER — TELEPHONE (OUTPATIENT)
Dept: SURGERY | Facility: CLINIC | Age: 31
End: 2021-10-07

## 2021-10-23 ENCOUNTER — OFFICE VISIT (OUTPATIENT)
Dept: SURGERY | Facility: CLINIC | Age: 31
End: 2021-10-23
Payer: COMMERCIAL

## 2021-10-23 VITALS
SYSTOLIC BLOOD PRESSURE: 106 MMHG | DIASTOLIC BLOOD PRESSURE: 69 MMHG | HEIGHT: 63 IN | HEART RATE: 63 BPM | BODY MASS INDEX: 22.44 KG/M2 | WEIGHT: 126.63 LBS

## 2021-10-23 DIAGNOSIS — K21.9 GASTROESOPHAGEAL REFLUX DISEASE WITHOUT ESOPHAGITIS: ICD-10-CM

## 2021-10-23 PROCEDURE — 3008F BODY MASS INDEX DOCD: CPT | Mod: CPTII,S$GLB,, | Performed by: SURGERY

## 2021-10-23 PROCEDURE — 1160F PR REVIEW ALL MEDS BY PRESCRIBER/CLIN PHARMACIST DOCUMENTED: ICD-10-PCS | Mod: CPTII,S$GLB,, | Performed by: SURGERY

## 2021-10-23 PROCEDURE — 3078F DIAST BP <80 MM HG: CPT | Mod: CPTII,S$GLB,, | Performed by: SURGERY

## 2021-10-23 PROCEDURE — 1160F RVW MEDS BY RX/DR IN RCRD: CPT | Mod: CPTII,S$GLB,, | Performed by: SURGERY

## 2021-10-23 PROCEDURE — 99999 PR PBB SHADOW E&M-EST. PATIENT-LVL IV: ICD-10-PCS | Mod: PBBFAC,,, | Performed by: SURGERY

## 2021-10-23 PROCEDURE — 3008F PR BODY MASS INDEX (BMI) DOCUMENTED: ICD-10-PCS | Mod: CPTII,S$GLB,, | Performed by: SURGERY

## 2021-10-23 PROCEDURE — 1159F MED LIST DOCD IN RCRD: CPT | Mod: CPTII,S$GLB,, | Performed by: SURGERY

## 2021-10-23 PROCEDURE — 1159F PR MEDICATION LIST DOCUMENTED IN MEDICAL RECORD: ICD-10-PCS | Mod: CPTII,S$GLB,, | Performed by: SURGERY

## 2021-10-23 PROCEDURE — 3074F PR MOST RECENT SYSTOLIC BLOOD PRESSURE < 130 MM HG: ICD-10-PCS | Mod: CPTII,S$GLB,, | Performed by: SURGERY

## 2021-10-23 PROCEDURE — 99204 PR OFFICE/OUTPT VISIT, NEW, LEVL IV, 45-59 MIN: ICD-10-PCS | Mod: S$GLB,,, | Performed by: SURGERY

## 2021-10-23 PROCEDURE — 99204 OFFICE O/P NEW MOD 45 MIN: CPT | Mod: S$GLB,,, | Performed by: SURGERY

## 2021-10-23 PROCEDURE — 99999 PR PBB SHADOW E&M-EST. PATIENT-LVL IV: CPT | Mod: PBBFAC,,, | Performed by: SURGERY

## 2021-10-23 PROCEDURE — 3078F PR MOST RECENT DIASTOLIC BLOOD PRESSURE < 80 MM HG: ICD-10-PCS | Mod: CPTII,S$GLB,, | Performed by: SURGERY

## 2021-10-23 PROCEDURE — 3074F SYST BP LT 130 MM HG: CPT | Mod: CPTII,S$GLB,, | Performed by: SURGERY

## 2021-10-23 RX ORDER — TRAZODONE HYDROCHLORIDE 50 MG/1
TABLET ORAL
COMMUNITY

## 2021-10-23 RX ORDER — DROSPIRENONE 4 MG/1
TABLET, FILM COATED ORAL
COMMUNITY

## 2021-10-23 RX ORDER — FREMANEZUMAB-VFRM 225 MG/1.5ML
INJECTION SUBCUTANEOUS
COMMUNITY

## 2021-10-23 RX ORDER — DICYCLOMINE HYDROCHLORIDE 10 MG/1
CAPSULE ORAL
COMMUNITY

## 2021-10-23 RX ORDER — UBROGEPANT 100 MG/1
TABLET ORAL
COMMUNITY

## 2021-10-23 RX ORDER — PANTOPRAZOLE SODIUM 40 MG/1
TABLET, DELAYED RELEASE ORAL
COMMUNITY

## 2021-10-23 RX ORDER — NARATRIPTAN 2.5 MG/1
TABLET ORAL
COMMUNITY

## 2021-10-23 RX ORDER — CETIRIZINE HYDROCHLORIDE 10 MG/1
TABLET ORAL
COMMUNITY

## 2021-10-23 RX ORDER — SERTRALINE HYDROCHLORIDE 25 MG/1
TABLET, FILM COATED ORAL
COMMUNITY

## 2021-10-23 RX ORDER — PROPRANOLOL HYDROCHLORIDE 10 MG/1
10 TABLET ORAL DAILY
COMMUNITY
Start: 2021-08-06

## 2021-10-23 RX ORDER — TRIAMCINOLONE ACETONIDE 1 MG/G
OINTMENT TOPICAL
COMMUNITY

## 2021-10-23 RX ORDER — CYCLOBENZAPRINE HCL 5 MG
5 TABLET ORAL NIGHTLY PRN
COMMUNITY
Start: 2021-05-18

## 2021-10-23 RX ORDER — FAMOTIDINE 40 MG/1
TABLET, FILM COATED ORAL
COMMUNITY

## 2021-10-23 RX ORDER — SUCRALFATE 1 G/10ML
SUSPENSION ORAL
COMMUNITY

## 2021-10-25 ENCOUNTER — TELEPHONE (OUTPATIENT)
Dept: SURGERY | Facility: CLINIC | Age: 31
End: 2021-10-25
Payer: COMMERCIAL

## 2021-10-25 DIAGNOSIS — K21.9 GASTROESOPHAGEAL REFLUX DISEASE WITHOUT ESOPHAGITIS: Primary | ICD-10-CM

## 2021-10-26 ENCOUNTER — PATIENT MESSAGE (OUTPATIENT)
Dept: ENDOSCOPY | Facility: HOSPITAL | Age: 31
End: 2021-10-26
Payer: COMMERCIAL

## 2021-10-27 ENCOUNTER — PATIENT MESSAGE (OUTPATIENT)
Dept: SURGERY | Facility: CLINIC | Age: 31
End: 2021-10-27
Payer: COMMERCIAL

## 2021-10-27 ENCOUNTER — TELEPHONE (OUTPATIENT)
Dept: SURGERY | Facility: CLINIC | Age: 31
End: 2021-10-27
Payer: COMMERCIAL

## 2021-11-01 ENCOUNTER — HOSPITAL ENCOUNTER (OUTPATIENT)
Dept: RADIOLOGY | Facility: HOSPITAL | Age: 31
Discharge: HOME | End: 2021-11-01
Attending: SURGERY
Payer: COMMERCIAL

## 2021-11-01 DIAGNOSIS — K21.9 GASTROESOPHAGEAL REFLUX DISEASE WITHOUT ESOPHAGITIS: ICD-10-CM

## 2021-11-01 PROCEDURE — 78264 GASTRIC EMPTYING IMG STUDY: CPT | Mod: 26,,, | Performed by: RADIOLOGY

## 2021-11-01 PROCEDURE — 78264 NM GASTRIC EMPTYING: ICD-10-PCS | Mod: 26,,, | Performed by: RADIOLOGY

## 2021-11-01 PROCEDURE — 78264 GASTRIC EMPTYING IMG STUDY: CPT | Mod: TC

## 2021-11-09 ENCOUNTER — OFFICE VISIT (OUTPATIENT)
Dept: SURGERY | Facility: CLINIC | Age: 31
End: 2021-11-09
Payer: COMMERCIAL

## 2021-11-09 DIAGNOSIS — K31.84 GASTROPARESIS: ICD-10-CM

## 2021-11-09 DIAGNOSIS — K21.9 GASTROESOPHAGEAL REFLUX DISEASE WITHOUT ESOPHAGITIS: Primary | ICD-10-CM

## 2021-11-09 PROCEDURE — 99215 OFFICE O/P EST HI 40 MIN: CPT | Mod: 95,,, | Performed by: SURGERY

## 2021-11-09 PROCEDURE — 1160F RVW MEDS BY RX/DR IN RCRD: CPT | Mod: CPTII,95,, | Performed by: SURGERY

## 2021-11-09 PROCEDURE — 99215 PR OFFICE/OUTPT VISIT, EST, LEVL V, 40-54 MIN: ICD-10-PCS | Mod: 95,,, | Performed by: SURGERY

## 2021-11-09 PROCEDURE — 1160F PR REVIEW ALL MEDS BY PRESCRIBER/CLIN PHARMACIST DOCUMENTED: ICD-10-PCS | Mod: CPTII,95,, | Performed by: SURGERY

## 2021-11-09 PROCEDURE — 1159F MED LIST DOCD IN RCRD: CPT | Mod: CPTII,95,, | Performed by: SURGERY

## 2021-11-09 PROCEDURE — 1159F PR MEDICATION LIST DOCUMENTED IN MEDICAL RECORD: ICD-10-PCS | Mod: CPTII,95,, | Performed by: SURGERY

## 2021-11-15 ENCOUNTER — PATIENT MESSAGE (OUTPATIENT)
Dept: SURGERY | Facility: CLINIC | Age: 31
End: 2021-11-15
Payer: COMMERCIAL

## 2021-12-03 ENCOUNTER — PATIENT MESSAGE (OUTPATIENT)
Dept: SURGERY | Facility: CLINIC | Age: 31
End: 2021-12-03
Payer: COMMERCIAL

## 2022-06-01 ENCOUNTER — DOCUMENTATION ONLY (OUTPATIENT)
Dept: CARDIAC CATH/INVASIVE PROCEDURES | Facility: HOSPITAL | Age: 32
End: 2022-06-01
Payer: COMMERCIAL

## 2022-06-16 ENCOUNTER — TELEPHONE (OUTPATIENT)
Dept: VASCULAR SURGERY | Facility: CLINIC | Age: 32
End: 2022-06-16
Payer: COMMERCIAL

## 2022-06-16 NOTE — TELEPHONE ENCOUNTER
Attempted to contact the pt to schedule an u/s appt but no answer.Left a voice message with a call back number [phone].

## 2022-06-29 ENCOUNTER — TELEPHONE (OUTPATIENT)
Dept: NEUROLOGY | Facility: CLINIC | Age: 32
End: 2022-06-29
Payer: COMMERCIAL

## 2022-06-29 NOTE — TELEPHONE ENCOUNTER
Key: [id number] Aimovig 140MG/M auto-injectors CaseId:[id number]; Status:Approved; Review Type:Prior Auth; Coverage Start Date:06/29/2022; Coverage End Date:06/29/2023

## 2023-03-09 NOTE — PROGRESS NOTES
Nazario is Geetha's niece and has symptoms consistent with Median Arcuate Ligament Syndrome (post prandial nausea, vomiting, wt. Loss 30 lbs,  food avoidance).    She has not had a CTA yet.  She saw Dr. Bella a year ago for similar complaints.  I would recommend we do a CTA (when contrast shortage allows) or celiac and SMA ultrasound sooner.    Will ask Kiran Bella what he thinks.    
Airway patent, normal appearing mouth, nose, throat, neck supple with full range of motion, no cervical adenopathy.

## 2023-05-31 ENCOUNTER — TELEPHONE (OUTPATIENT)
Dept: NEUROLOGY | Facility: CLINIC | Age: 33
End: 2023-05-31
Payer: COMMERCIAL

## 2023-05-31 NOTE — TELEPHONE ENCOUNTER
JEAN SHAH (Key: [id number]) - [id number]  Aimovig 140MG/ML auto-injectors  Status: PA Response - Approved  Created: May 31st, 2023  Sent: May 31st, 2023

## (undated) DEVICE — DRAPE MINOR PROCEDURE

## (undated) DEVICE — SUT CHROMIC 3-0 SH 27IN GUT

## (undated) DEVICE — SUT 3-0 VICRYL / SH (J416)

## (undated) DEVICE — GLOVE PROTEXIS LTX MICRO 8

## (undated) DEVICE — SCALPEL #11 BLADE STRL DISP

## (undated) DEVICE — SYR 30CC LUER LOCK

## (undated) DEVICE — NDL SAFETY 25G X 1.5 ECLIPSE

## (undated) DEVICE — NDL ECLIPSE SAFETY 18GX1-1/2IN

## (undated) DEVICE — SPONGE SURGIFOAM 100 8.5X12X10

## (undated) DEVICE — GAUZE SPONGE 4X4 12PLY

## (undated) DEVICE — PACK GENERAL SURGERY